# Patient Record
Sex: MALE | Race: ASIAN | NOT HISPANIC OR LATINO | URBAN - METROPOLITAN AREA
[De-identification: names, ages, dates, MRNs, and addresses within clinical notes are randomized per-mention and may not be internally consistent; named-entity substitution may affect disease eponyms.]

---

## 2018-02-02 ENCOUNTER — EMERGENCY (EMERGENCY)
Facility: HOSPITAL | Age: 63
LOS: 1 days | Discharge: HOME | End: 2018-02-02
Attending: EMERGENCY MEDICINE
Payer: COMMERCIAL

## 2018-02-02 VITALS
RESPIRATION RATE: 16 BRPM | SYSTOLIC BLOOD PRESSURE: 129 MMHG | DIASTOLIC BLOOD PRESSURE: 70 MMHG | OXYGEN SATURATION: 97 % | HEART RATE: 77 BPM

## 2018-02-02 VITALS
SYSTOLIC BLOOD PRESSURE: 118 MMHG | OXYGEN SATURATION: 99 % | TEMPERATURE: 98 F | RESPIRATION RATE: 18 BRPM | HEART RATE: 74 BPM | DIASTOLIC BLOOD PRESSURE: 70 MMHG

## 2018-02-02 DIAGNOSIS — Z98.89 OTHER SPECIFIED POSTPROCEDURAL STATES: Chronic | ICD-10-CM

## 2018-02-02 LAB
ALBUMIN SERPL ELPH-MCNC: 3.8 G/DL — SIGNIFICANT CHANGE UP (ref 3.3–5)
ALP SERPL-CCNC: 86 U/L — SIGNIFICANT CHANGE UP (ref 40–120)
ALT FLD-CCNC: 23 U/L RC — SIGNIFICANT CHANGE UP (ref 10–45)
ANION GAP SERPL CALC-SCNC: 13 MMOL/L — SIGNIFICANT CHANGE UP (ref 5–17)
AST SERPL-CCNC: 22 U/L — SIGNIFICANT CHANGE UP (ref 10–40)
BASOPHILS # BLD AUTO: 0 K/UL — SIGNIFICANT CHANGE UP (ref 0–0.2)
BASOPHILS NFR BLD AUTO: 0.5 % — SIGNIFICANT CHANGE UP (ref 0–2)
BILIRUB SERPL-MCNC: 0.3 MG/DL — SIGNIFICANT CHANGE UP (ref 0.2–1.2)
BUN SERPL-MCNC: 19 MG/DL — SIGNIFICANT CHANGE UP (ref 7–23)
CALCIUM SERPL-MCNC: 9 MG/DL — SIGNIFICANT CHANGE UP (ref 8.4–10.5)
CHLORIDE SERPL-SCNC: 101 MMOL/L — SIGNIFICANT CHANGE UP (ref 96–108)
CO2 SERPL-SCNC: 27 MMOL/L — SIGNIFICANT CHANGE UP (ref 22–31)
CREAT SERPL-MCNC: 1.67 MG/DL — HIGH (ref 0.5–1.3)
EOSINOPHIL # BLD AUTO: 0.3 K/UL — SIGNIFICANT CHANGE UP (ref 0–0.5)
EOSINOPHIL NFR BLD AUTO: 3.1 % — SIGNIFICANT CHANGE UP (ref 0–6)
GLUCOSE SERPL-MCNC: 132 MG/DL — HIGH (ref 70–99)
HCT VFR BLD CALC: 37.6 % — LOW (ref 39–50)
HGB BLD-MCNC: 13.7 G/DL — SIGNIFICANT CHANGE UP (ref 13–17)
LYMPHOCYTES # BLD AUTO: 1.1 K/UL — SIGNIFICANT CHANGE UP (ref 1–3.3)
LYMPHOCYTES # BLD AUTO: 11.8 % — LOW (ref 13–44)
MAGNESIUM SERPL-MCNC: 2 MG/DL — SIGNIFICANT CHANGE UP (ref 1.6–2.6)
MCHC RBC-ENTMCNC: 32.9 PG — SIGNIFICANT CHANGE UP (ref 27–34)
MCHC RBC-ENTMCNC: 36.4 GM/DL — HIGH (ref 32–36)
MCV RBC AUTO: 90.5 FL — SIGNIFICANT CHANGE UP (ref 80–100)
MONOCYTES # BLD AUTO: 0.7 K/UL — SIGNIFICANT CHANGE UP (ref 0–0.9)
MONOCYTES NFR BLD AUTO: 7.7 % — SIGNIFICANT CHANGE UP (ref 2–14)
NEUTROPHILS # BLD AUTO: 7.1 K/UL — SIGNIFICANT CHANGE UP (ref 1.8–7.4)
NEUTROPHILS NFR BLD AUTO: 76.9 % — SIGNIFICANT CHANGE UP (ref 43–77)
PLATELET # BLD AUTO: 211 K/UL — SIGNIFICANT CHANGE UP (ref 150–400)
POTASSIUM SERPL-MCNC: 2.8 MMOL/L — CRITICAL LOW (ref 3.5–5.3)
POTASSIUM SERPL-SCNC: 2.8 MMOL/L — CRITICAL LOW (ref 3.5–5.3)
PROT SERPL-MCNC: 6.6 G/DL — SIGNIFICANT CHANGE UP (ref 6–8.3)
RBC # BLD: 4.16 M/UL — LOW (ref 4.2–5.8)
RBC # FLD: 13.1 % — SIGNIFICANT CHANGE UP (ref 10.3–14.5)
SODIUM SERPL-SCNC: 141 MMOL/L — SIGNIFICANT CHANGE UP (ref 135–145)
WBC # BLD: 9.2 K/UL — SIGNIFICANT CHANGE UP (ref 3.8–10.5)
WBC # FLD AUTO: 9.2 K/UL — SIGNIFICANT CHANGE UP (ref 3.8–10.5)

## 2018-02-02 PROCEDURE — 85027 COMPLETE CBC AUTOMATED: CPT

## 2018-02-02 PROCEDURE — 70450 CT HEAD/BRAIN W/O DYE: CPT

## 2018-02-02 PROCEDURE — 80053 COMPREHEN METABOLIC PANEL: CPT

## 2018-02-02 PROCEDURE — 71045 X-RAY EXAM CHEST 1 VIEW: CPT

## 2018-02-02 PROCEDURE — 83735 ASSAY OF MAGNESIUM: CPT

## 2018-02-02 PROCEDURE — 71045 X-RAY EXAM CHEST 1 VIEW: CPT | Mod: 26

## 2018-02-02 PROCEDURE — 93005 ELECTROCARDIOGRAM TRACING: CPT

## 2018-02-02 PROCEDURE — 93010 ELECTROCARDIOGRAM REPORT: CPT

## 2018-02-02 PROCEDURE — 72125 CT NECK SPINE W/O DYE: CPT | Mod: 26

## 2018-02-02 PROCEDURE — 99284 EMERGENCY DEPT VISIT MOD MDM: CPT | Mod: 25

## 2018-02-02 PROCEDURE — 99285 EMERGENCY DEPT VISIT HI MDM: CPT | Mod: 25

## 2018-02-02 PROCEDURE — 72125 CT NECK SPINE W/O DYE: CPT

## 2018-02-02 PROCEDURE — 70450 CT HEAD/BRAIN W/O DYE: CPT | Mod: 26

## 2018-02-02 RX ORDER — POTASSIUM CHLORIDE 20 MEQ
40 PACKET (EA) ORAL ONCE
Qty: 0 | Refills: 0 | Status: COMPLETED | OUTPATIENT
Start: 2018-02-02 | End: 2018-02-02

## 2018-02-02 RX ORDER — SODIUM CHLORIDE 9 MG/ML
1000 INJECTION INTRAMUSCULAR; INTRAVENOUS; SUBCUTANEOUS ONCE
Qty: 0 | Refills: 0 | Status: COMPLETED | OUTPATIENT
Start: 2018-02-02 | End: 2018-02-02

## 2018-02-02 RX ORDER — SODIUM CHLORIDE 9 MG/ML
1000 INJECTION INTRAMUSCULAR; INTRAVENOUS; SUBCUTANEOUS
Qty: 0 | Refills: 0 | Status: DISCONTINUED | OUTPATIENT
Start: 2018-02-02 | End: 2018-02-06

## 2018-02-02 RX ORDER — OXYCODONE AND ACETAMINOPHEN 5; 325 MG/1; MG/1
1 TABLET ORAL ONCE
Qty: 0 | Refills: 0 | Status: DISCONTINUED | OUTPATIENT
Start: 2018-02-02 | End: 2018-02-02

## 2018-02-02 RX ADMIN — SODIUM CHLORIDE 2000 MILLILITER(S): 9 INJECTION INTRAMUSCULAR; INTRAVENOUS; SUBCUTANEOUS at 18:02

## 2018-02-02 RX ADMIN — Medication 40 MILLIEQUIVALENT(S): at 19:06

## 2018-02-02 RX ADMIN — OXYCODONE AND ACETAMINOPHEN 1 TABLET(S): 5; 325 TABLET ORAL at 20:52

## 2018-02-02 RX ADMIN — SODIUM CHLORIDE 150 MILLILITER(S): 9 INJECTION INTRAMUSCULAR; INTRAVENOUS; SUBCUTANEOUS at 20:14

## 2018-02-02 NOTE — ED ADULT NURSE NOTE - OBJECTIVE STATEMENT
63 y/o male, a&o x3, brought in by [facility], s/p syncopal episode outside of dermatologist's office. Patient states he was ringing doorbell and collapsed. Bystander report to EMS approx 5min time unresponsive. EMS reports BP 84/50, placed 20 gauge IV in left AC and administered 1L normal saline, patient returned to systolic BP approx 110. C-Collar applied by EMS. Fingerstick in field of 130. Breathing even, full, unlabored, lungs clear/equal bilateral, no SOB. Abdomen soft, nontender, nondistended. Abrasion of left nose and left eyebrow. Neck soreness on right side. Denies weakness, dizziness, nausea, vomiting, chest pain, or SOB. Resting on stretcher. MD at bedside. Advised of plan of care. Patient placed on continuous cardiac monitoring.

## 2018-02-02 NOTE — ED PROVIDER NOTE - PLAN OF CARE
Hypokalemia 1) You were here for syncope.   2) Take your current medications as prescribed.   3) Follow up with your primary doctor for further evaluation and to answer any questions you have.    4) Return to the emergency department if you experience worsening symptoms, pain, fever, chills, nausea, vomiting or other concerning symptoms.

## 2018-02-02 NOTE — ED PROVIDER NOTE - PMH
Diabetes mellitus    High cholesterol    Hypertension    Renal insufficiency    Tremor    Trigger finger, left

## 2018-02-02 NOTE — ED PROVIDER NOTE - PROGRESS NOTE DETAILS
Spoke with pt's primary medical doctor Antonina Umanzor who is now aware of pt's recent polyuria, dehydration and syncope.  Labs within normal limits, hct and ct c-spine normal.  EKG normal.  Will send home and patient will fu with Dr. Umanzor this week.

## 2018-02-02 NOTE — ED PROVIDER NOTE - ATTENDING CONTRIBUTION TO CARE
------------ATTENDING NOTE------------   63 yo M c/o feeling lightheaded and tunnel vision while standing in office, bystanders describes sudden loss of consciousness, pt fell forward and hit L forehead, awoke after several seconds, no only c/o mild dull frontal headache and diffuse posterior neck pain, no nausea or AMS or confusion, no chest pain/discomfort, no SOB/dyspnea, equal distal pulses, no diaphoresis, no abdominal pain, no h/o syncope, low suspicion for ACS, awaiting labs/imaging and close reassessments -->  - Noé Biswas MD   ------------------------------------------------------- ------------ATTENDING NOTE------------   61 yo M c/o feeling lightheaded and tunnel vision while standing in office, bystanders describes sudden loss of consciousness, pt fell forward and hit L forehead, awoke after several seconds, no only c/o mild dull frontal headache and diffuse posterior neck pain, no nausea or AMS or confusion, no chest pain/discomfort, no SOB/dyspnea, equal distal pulses, no diaphoresis, no abdominal pain, no h/o syncope, low suspicion for ACS, awaiting labs/imaging and close reassessments --> hypokalemia, likely as result to poorly controlled DM/hyperglycemia, syncope related to hypovolemia, remained asymptomatic ED course, d/w son/wife, d/w PCP, in depth d/w all about ddx, tx, mancini, close outpt fu.  - Noé Biswas MD   -------------------------------------------------------

## 2018-02-02 NOTE — ED PROVIDER NOTE - CARE PLAN
Principal Discharge DX:	Syncope and collapse  Secondary Diagnosis:	Closed head injury, initial encounter Principal Discharge DX:	Syncope and collapse  Goal:	Hypokalemia  Secondary Diagnosis:	Closed head injury, initial encounter  Secondary Diagnosis:	Dehydration Principal Discharge DX:	Syncope and collapse  Goal:	Hypokalemia  Assessment and plan of treatment:	1) You were here for syncope.   2) Take your current medications as prescribed.   3) Follow up with your primary doctor for further evaluation and to answer any questions you have.    4) Return to the emergency department if you experience worsening symptoms, pain, fever, chills, nausea, vomiting or other concerning symptoms.  Secondary Diagnosis:	Closed head injury, initial encounter  Secondary Diagnosis:	Dehydration

## 2018-02-02 NOTE — ED PROVIDER NOTE - FAMILY HISTORY
Father  Still living? Unknown  Family history of diabetes mellitus, Age at diagnosis: Age Unknown  History of hypertension, Age at diagnosis: Age Unknown  Family history of high cholesterol, Age at diagnosis: Age Unknown     Mother  Still living? Unknown  Family history of diabetes mellitus, Age at diagnosis: Age Unknown  History of hypertension, Age at diagnosis: Age Unknown  Family history of high cholesterol, Age at diagnosis: Age Unknown     Sibling  Still living? Unknown  Family history of diabetes mellitus, Age at diagnosis: Age Unknown

## 2018-02-02 NOTE — ED PROVIDER NOTE - MUSCULOSKELETAL, MLM
Spine appears normal, range of motion is not limited, no muscle or joint tenderness.  TTP R lateral cervical spine.

## 2018-03-13 ENCOUNTER — APPOINTMENT (OUTPATIENT)
Dept: DERMATOLOGY | Facility: CLINIC | Age: 63
End: 2018-03-13
Payer: COMMERCIAL

## 2018-03-13 VITALS
SYSTOLIC BLOOD PRESSURE: 132 MMHG | HEIGHT: 68 IN | BODY MASS INDEX: 27.28 KG/M2 | DIASTOLIC BLOOD PRESSURE: 80 MMHG | WEIGHT: 180 LBS

## 2018-03-13 DIAGNOSIS — L25.9 UNSPECIFIED CONTACT DERMATITIS, UNSPECIFIED CAUSE: ICD-10-CM

## 2018-03-13 DIAGNOSIS — Z56.0 UNEMPLOYMENT, UNSPECIFIED: ICD-10-CM

## 2018-03-13 DIAGNOSIS — L81.0 POSTINFLAMMATORY HYPERPIGMENTATION: ICD-10-CM

## 2018-03-13 DIAGNOSIS — Z91.89 OTHER SPECIFIED PERSONAL RISK FACTORS, NOT ELSEWHERE CLASSIFIED: ICD-10-CM

## 2018-03-13 PROCEDURE — 99203 OFFICE O/P NEW LOW 30 MIN: CPT

## 2018-03-13 SDOH — ECONOMIC STABILITY - INCOME SECURITY: UNEMPLOYMENT, UNSPECIFIED: Z56.0

## 2018-05-14 ENCOUNTER — RESULT REVIEW (OUTPATIENT)
Age: 63
End: 2018-05-14

## 2018-07-03 ENCOUNTER — APPOINTMENT (OUTPATIENT)
Dept: PULMONOLOGY | Facility: CLINIC | Age: 63
End: 2018-07-03
Payer: COMMERCIAL

## 2018-07-03 VITALS
DIASTOLIC BLOOD PRESSURE: 90 MMHG | TEMPERATURE: 98.4 F | BODY MASS INDEX: 28.79 KG/M2 | RESPIRATION RATE: 46 BRPM | HEIGHT: 68 IN | WEIGHT: 190 LBS | SYSTOLIC BLOOD PRESSURE: 150 MMHG | HEART RATE: 79 BPM

## 2018-07-03 DIAGNOSIS — G47.00 INSOMNIA, UNSPECIFIED: ICD-10-CM

## 2018-07-03 DIAGNOSIS — Z72.821 INADEQUATE SLEEP HYGIENE: ICD-10-CM

## 2018-07-03 DIAGNOSIS — R06.83 SNORING: ICD-10-CM

## 2018-07-03 PROCEDURE — 99204 OFFICE O/P NEW MOD 45 MIN: CPT

## 2018-07-05 RX ORDER — AMOXICILLIN AND CLAVULANATE POTASSIUM 875; 125 MG/1; MG/1
875-125 TABLET, COATED ORAL
Qty: 20 | Refills: 0 | Status: DISCONTINUED | COMMUNITY
Start: 2018-04-02

## 2018-07-05 RX ORDER — PEN NEEDLE, DIABETIC 29 G X1/2"
31G X 5 MM NEEDLE, DISPOSABLE MISCELLANEOUS
Qty: 360 | Refills: 0 | Status: ACTIVE | COMMUNITY
Start: 2017-12-14

## 2018-07-05 RX ORDER — SODIUM SULFATE, POTASSIUM SULFATE, MAGNESIUM SULFATE 17.5; 3.13; 1.6 G/ML; G/ML; G/ML
17.5-3.13-1.6 SOLUTION, CONCENTRATE ORAL
Qty: 354 | Refills: 0 | Status: DISCONTINUED | COMMUNITY
Start: 2018-03-14

## 2018-07-05 RX ORDER — LOSARTAN POTASSIUM 50 MG/1
50 TABLET, FILM COATED ORAL
Qty: 30 | Refills: 0 | Status: DISCONTINUED | COMMUNITY
Start: 2018-02-21

## 2018-07-05 RX ORDER — ACETAMINOPHEN AND CODEINE 300; 30 MG/1; MG/1
300-30 TABLET ORAL
Qty: 8 | Refills: 0 | Status: DISCONTINUED | COMMUNITY
Start: 2018-05-16

## 2018-07-05 RX ORDER — ISOPROPYL ALCOHOL 0.75 G/1
SWAB TOPICAL
Qty: 700 | Refills: 0 | Status: ACTIVE | COMMUNITY
Start: 2017-10-25

## 2018-07-05 RX ORDER — LOSARTAN POTASSIUM 100 MG/1
100 TABLET, FILM COATED ORAL
Qty: 90 | Refills: 0 | Status: ACTIVE | COMMUNITY
Start: 2018-03-02

## 2018-07-05 RX ORDER — TAMSULOSIN HYDROCHLORIDE 0.4 MG/1
0.4 CAPSULE ORAL
Qty: 90 | Refills: 0 | Status: ACTIVE | COMMUNITY
Start: 2018-03-13

## 2018-07-05 RX ORDER — LOSARTAN POTASSIUM 25 MG/1
25 TABLET, FILM COATED ORAL
Qty: 30 | Refills: 0 | Status: DISCONTINUED | COMMUNITY
Start: 2018-02-16

## 2018-07-12 ENCOUNTER — APPOINTMENT (OUTPATIENT)
Dept: SLEEP CENTER | Facility: CLINIC | Age: 63
End: 2018-07-12
Payer: COMMERCIAL

## 2018-07-12 PROCEDURE — 95806 SLEEP STUDY UNATT&RESP EFFT: CPT | Mod: 26

## 2018-07-13 ENCOUNTER — OUTPATIENT (OUTPATIENT)
Dept: OUTPATIENT SERVICES | Facility: HOSPITAL | Age: 63
LOS: 1 days | End: 2018-07-13
Payer: COMMERCIAL

## 2018-07-13 DIAGNOSIS — Z98.89 OTHER SPECIFIED POSTPROCEDURAL STATES: Chronic | ICD-10-CM

## 2018-07-13 PROCEDURE — 95806 SLEEP STUDY UNATT&RESP EFFT: CPT

## 2018-07-16 DIAGNOSIS — G47.33 OBSTRUCTIVE SLEEP APNEA (ADULT) (PEDIATRIC): ICD-10-CM

## 2019-06-10 ENCOUNTER — APPOINTMENT (OUTPATIENT)
Dept: NEPHROLOGY | Facility: CLINIC | Age: 64
End: 2019-06-10
Payer: MEDICARE

## 2019-06-10 VITALS
DIASTOLIC BLOOD PRESSURE: 98 MMHG | WEIGHT: 194 LBS | SYSTOLIC BLOOD PRESSURE: 149 MMHG | BODY MASS INDEX: 29.4 KG/M2 | HEIGHT: 68 IN | OXYGEN SATURATION: 98 % | HEART RATE: 81 BPM

## 2019-06-10 PROCEDURE — 99205 OFFICE O/P NEW HI 60 MIN: CPT

## 2019-06-10 RX ORDER — CARVEDILOL 25 MG/1
25 TABLET, FILM COATED ORAL TWICE DAILY
Qty: 180 | Refills: 3 | Status: ACTIVE | COMMUNITY
Start: 2018-04-02

## 2019-06-10 RX ORDER — TOBRAMYCIN AND DEXAMETHASONE 3; 1 MG/ML; MG/ML
0.3-0.1 SUSPENSION/ DROPS OPHTHALMIC
Qty: 5 | Refills: 0 | Status: DISCONTINUED | COMMUNITY
Start: 2018-05-16 | End: 2019-06-10

## 2019-06-10 RX ORDER — HYDRALAZINE HYDROCHLORIDE 25 MG/1
25 TABLET ORAL 3 TIMES DAILY
Qty: 270 | Refills: 3 | Status: ACTIVE | COMMUNITY
Start: 2019-06-10

## 2019-06-10 RX ORDER — AMLODIPINE BESYLATE 10 MG/1
10 TABLET ORAL DAILY
Qty: 30 | Refills: 11 | Status: ACTIVE | COMMUNITY
Start: 2018-02-16

## 2019-06-10 RX ORDER — HYDROCHLOROTHIAZIDE 12.5 MG/1
12.5 TABLET ORAL
Qty: 30 | Refills: 0 | Status: DISCONTINUED | COMMUNITY
Start: 2018-03-02 | End: 2019-06-10

## 2019-06-10 NOTE — PHYSICAL EXAM
[General Appearance - Alert] : alert [General Appearance - In No Acute Distress] : in no acute distress [Sclera] : the sclera and conjunctiva were normal [PERRL With Normal Accommodation] : pupils were equal in size, round, and reactive to light [Extraocular Movements] : extraocular movements were intact [Outer Ear] : the ears and nose were normal in appearance [Oropharynx] : the oropharynx was normal [Neck Appearance] : the appearance of the neck was normal [Neck Cervical Mass (___cm)] : no neck mass was observed [Jugular Venous Distention Increased] : there was no jugular-venous distention [Thyroid Diffuse Enlargement] : the thyroid was not enlarged [Thyroid Nodule] : there were no palpable thyroid nodules [Auscultation Breath Sounds / Voice Sounds] : lungs were clear to auscultation bilaterally [Heart Rate And Rhythm] : heart rate was normal and rhythm regular [Heart Sounds Gallop] : no gallops [Heart Sounds] : normal S1 and S2 [Heart Sounds Pericardial Friction Rub] : no pericardial rub [Murmurs] : no murmurs [Full Pulse] : the pedal pulses are present [Edema] : there was no peripheral edema [Bowel Sounds] : normal bowel sounds [Abdomen Tenderness] : non-tender [Abdomen Soft] : soft [Abdomen Mass (___ Cm)] : no abdominal mass palpated [] : no rash [Involuntary Movements] : no involuntary movements were seen [No Focal Deficits] : no focal deficits [Oriented To Time, Place, And Person] : oriented to person, place, and time [Impaired Insight] : insight and judgment were intact [Affect] : the affect was normal [FreeTextEntry1] : tattoes

## 2019-06-10 NOTE — REASON FOR VISIT
[Consultation] : a consultation visit [FreeTextEntry1] : Referred for nephrology consultation by primary MD, for potassium abnormalities

## 2019-06-10 NOTE — ASSESSMENT
[FreeTextEntry1] : Elevated creatinine: Will repeat blood chemistry including phosphorus, uric acid, A1C, immunofixation and Hepatitis B and C panel.\par Will get repeat urinalysis and urine protein/creatinine. \par Low serum K, now normal on supplementsCheck urine lytes.\par Will get renal and post void bladder sonogram\par CKD risk factors- smoker, DM, HTN\par Discussed current level of renal function/proteinuria and its implications.\par Discussed renal function preservation strategies including: Sleep hygiene, avoiding NSAIDs and herbal medications, avoiding excessive animal protein and sodium in diet, maintaining optimized weight, blood pressure, glucose and lipids.\par Discussed hyperinsulinemia, metabolic syndrome and implication for cardiovascular health.\par F/u in 6 weeks to review labs and imaging.\par

## 2019-06-10 NOTE — HISTORY OF PRESENT ILLNESS
[FreeTextEntry1] : 63 years old male, born in [facility],  living in  since \par Patient has no known kidney disease. He is on follow up with Dr. Antonina Umanzor is here for nephrology consultation\par He is told his potassium level in blood was abnormal (he thinks low) in the past but normal now.\par He has known DM (age 30's) On insulin (age 54); HTN (age 30's). H/o Hyperlipidemia. No h/o gout.\par No known h/o kidney stone/ Prostatism.\par No hematuria/Transfusions\par Nocturia:3-4 times\par Has no h/o Pneumonia / UTI.\par No known h/o active CAD/CVA/PVD/DVT/neoplasia/active infections/bleeding.\par Reports no major allergies except for allergic rhinitis. Does not take any blood thinners. No known h/o tuberculosis or hepatitis.\par Most recent hospitalization/for: 2018 for low glucose level\par Past surgeries:\par hemorrhoids, arthroscopic knee surgery, bilateral carpal tunnel syndrome surgery, right ankle fracture repair\par No history of kidney/ bladder/ prostate surgery.\par He is a smoker. Half ppd x 40 years\par Fam: Parents are . Father - 65 Mother- 70 Siblings- 2 brothers and one sister are alive.\par Children: One son, 23, Healthy. \par No family history of kidney disease\par Independent for ADL\par Lives with wife, Robert. Wife is an assistant director of nursing at [employer], working part time. Son, Ruben  is studying at [employer], studying earth science. \par Able to walk two blocks, can climb stairs without difficulty.\par ROS: Has h/o shortness of breath on exertion. No h/o Sleep apnea. 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 Vazquez- Has had stress test and echo, takes \par Cancer Screen:Colonoscopy, 2018 \par Imaging:\par Consultants:\par Primary MD:Antonina Umanzor\par \par Available lab data: Creatinine: \par (3/20/19) 1.5 ( e GFR 48 -56) (11/10/18) 1.61 (18) 1.35 (18) 1.5 (1/10/18) 1.55\par \par (3/20/18) chol 158\par \par Others (3/20/19) Na 138 K 4.1 CO 2 : 24 (18) phos: 3\par (3/20/19) Hemoglobin a1 C: 8.4\par \par urinalysis (17); urinalysis: protein: none blood: none glucose: none\par \par (18) colonoscopy; hyperplastic polyp\par \par (1/10/18) TSH: 0.96\par

## 2019-06-13 ENCOUNTER — FORM ENCOUNTER (OUTPATIENT)
Age: 64
End: 2019-06-13

## 2019-06-14 ENCOUNTER — OUTPATIENT (OUTPATIENT)
Dept: OUTPATIENT SERVICES | Facility: HOSPITAL | Age: 64
LOS: 1 days | End: 2019-06-14
Payer: MEDICARE

## 2019-06-14 ENCOUNTER — APPOINTMENT (OUTPATIENT)
Dept: ULTRASOUND IMAGING | Facility: CLINIC | Age: 64
End: 2019-06-14
Payer: MEDICARE

## 2019-06-14 DIAGNOSIS — Z98.89 OTHER SPECIFIED POSTPROCEDURAL STATES: Chronic | ICD-10-CM

## 2019-06-14 DIAGNOSIS — Z00.8 ENCOUNTER FOR OTHER GENERAL EXAMINATION: ICD-10-CM

## 2019-06-14 PROCEDURE — 93975 VASCULAR STUDY: CPT

## 2019-06-14 PROCEDURE — 93975 VASCULAR STUDY: CPT | Mod: 26

## 2019-07-22 ENCOUNTER — APPOINTMENT (OUTPATIENT)
Dept: NEPHROLOGY | Facility: CLINIC | Age: 64
End: 2019-07-22
Payer: MEDICARE

## 2019-07-22 VITALS
HEART RATE: 80 BPM | SYSTOLIC BLOOD PRESSURE: 143 MMHG | DIASTOLIC BLOOD PRESSURE: 90 MMHG | HEIGHT: 68 IN | WEIGHT: 194.89 LBS | OXYGEN SATURATION: 97 % | BODY MASS INDEX: 29.54 KG/M2

## 2019-07-22 VITALS — DIASTOLIC BLOOD PRESSURE: 84 MMHG | SYSTOLIC BLOOD PRESSURE: 130 MMHG

## 2019-07-22 LAB
25(OH)D3 SERPL-MCNC: 39.8 NG/ML
ALBUMIN SERPL ELPH-MCNC: 4.6 G/DL
ALP BLD-CCNC: 97 U/L
ALT SERPL-CCNC: 11 U/L
ANION GAP SERPL CALC-SCNC: 13 MMOL/L
APPEARANCE: ABNORMAL
AST SERPL-CCNC: 14 U/L
BACTERIA: NEGATIVE
BASOPHILS # BLD AUTO: 0.08 K/UL
BASOPHILS NFR BLD AUTO: 0.8 %
BILIRUB SERPL-MCNC: 0.4 MG/DL
BILIRUBIN URINE: NEGATIVE
BLOOD URINE: NEGATIVE
BUN SERPL-MCNC: 22 MG/DL
CALCIUM OXALATE CRYSTALS: ABNORMAL
CALCIUM SERPL-MCNC: 9.8 MG/DL
CALCIUM SERPL-MCNC: 9.8 MG/DL
CHLORIDE SERPL-SCNC: 102 MMOL/L
CHOLEST SERPL-MCNC: 185 MG/DL
CHOLEST/HDLC SERPL: 4.1 RATIO
CO2 SERPL-SCNC: 26 MMOL/L
COLOR: YELLOW
CREAT SERPL-MCNC: 1.68 MG/DL
CREAT SPEC-SCNC: 297 MG/DL
CREAT/PROT UR: 0.1 RATIO
EOSINOPHIL # BLD AUTO: 0.54 K/UL
EOSINOPHIL NFR BLD AUTO: 5.4 %
ESTIMATED AVERAGE GLUCOSE: 192 MG/DL
GLUCOSE QUALITATIVE U: NORMAL
GLUCOSE SERPL-MCNC: 78 MG/DL
HBA1C MFR BLD HPLC: 8.3 %
HBV SURFACE AG SER QL: NONREACTIVE
HCT VFR BLD CALC: 44.6 %
HCV AB SER QL: NONREACTIVE
HCV S/CO RATIO: 0.15 S/CO
HDLC SERPL-MCNC: 45 MG/DL
HGB BLD-MCNC: 14.7 G/DL
HYALINE CASTS: 0 /LPF
IMM GRANULOCYTES NFR BLD AUTO: 0.3 %
KETONES URINE: NEGATIVE
LDLC SERPL CALC-MCNC: 95 MG/DL
LEUKOCYTE ESTERASE URINE: NEGATIVE
LYMPHOCYTES # BLD AUTO: 1.98 K/UL
LYMPHOCYTES NFR BLD AUTO: 19.8 %
M PROTEIN SPEC IFE-MCNC: NORMAL
MAGNESIUM SERPL-MCNC: 2.2 MG/DL
MAN DIFF?: NORMAL
MCHC RBC-ENTMCNC: 30.6 PG
MCHC RBC-ENTMCNC: 33 GM/DL
MCV RBC AUTO: 92.9 FL
MICROSCOPIC-UA: NORMAL
MONOCYTES # BLD AUTO: 0.85 K/UL
MONOCYTES NFR BLD AUTO: 8.5 %
NEUTROPHILS # BLD AUTO: 6.5 K/UL
NEUTROPHILS NFR BLD AUTO: 65.2 %
NITRITE URINE: NEGATIVE
PARATHYROID HORMONE INTACT: 32 PG/ML
PH URINE: 6
PHOSPHATE SERPL-MCNC: 4.1 MG/DL
PLATELET # BLD AUTO: 244 K/UL
POTASSIUM SERPL-SCNC: 4.4 MMOL/L
POTASSIUM UR-SCNC: 127 MMOL/L
PROT SERPL-MCNC: 7.4 G/DL
PROT UR-MCNC: 30 MG/DL
PROTEIN URINE: ABNORMAL
RBC # BLD: 4.8 M/UL
RBC # FLD: 12.4 %
RED BLOOD CELLS URINE: 0 /HPF
SODIUM ?TM SUB UR QN: 46 MMOL/L
SODIUM SERPL-SCNC: 141 MMOL/L
SPECIFIC GRAVITY URINE: 1.03
SQUAMOUS EPITHELIAL CELLS: 1 /HPF
TRIGL SERPL-MCNC: 225 MG/DL
URATE SERPL-MCNC: 5.6 MG/DL
UROBILINOGEN URINE: NORMAL
WBC # FLD AUTO: 9.98 K/UL
WHITE BLOOD CELLS URINE: 1 /HPF

## 2019-07-22 PROCEDURE — 99214 OFFICE O/P EST MOD 30 MIN: CPT

## 2019-07-22 NOTE — PHYSICAL EXAM
[General Appearance - Alert] : alert [General Appearance - In No Acute Distress] : in no acute distress [Sclera] : the sclera and conjunctiva were normal [PERRL With Normal Accommodation] : pupils were equal in size, round, and reactive to light [Extraocular Movements] : extraocular movements were intact [Outer Ear] : the ears and nose were normal in appearance [Oropharynx] : the oropharynx was normal [Neck Appearance] : the appearance of the neck was normal [Neck Cervical Mass (___cm)] : no neck mass was observed [Jugular Venous Distention Increased] : there was no jugular-venous distention [Thyroid Nodule] : there were no palpable thyroid nodules [Thyroid Diffuse Enlargement] : the thyroid was not enlarged [Auscultation Breath Sounds / Voice Sounds] : lungs were clear to auscultation bilaterally [Heart Sounds] : normal S1 and S2 [Heart Rate And Rhythm] : heart rate was normal and rhythm regular [Heart Sounds Gallop] : no gallops [Murmurs] : no murmurs [Heart Sounds Pericardial Friction Rub] : no pericardial rub [Edema] : there was no peripheral edema [Bowel Sounds] : normal bowel sounds [Full Pulse] : the pedal pulses are present [Abdomen Soft] : soft [Abdomen Tenderness] : non-tender [Abdomen Mass (___ Cm)] : no abdominal mass palpated [Involuntary Movements] : no involuntary movements were seen [] : no rash [FreeTextEntry1] : tattoes [Oriented To Time, Place, And Person] : oriented to person, place, and time [No Focal Deficits] : no focal deficits [Affect] : the affect was normal [Impaired Insight] : insight and judgment were intact

## 2019-07-22 NOTE — HISTORY OF PRESENT ILLNESS
[FreeTextEntry1] : Mr Collazo is here for follow up. Has no acute symptoms\par Here for CKD f/u\par Reviewed labs and sonogram

## 2019-07-22 NOTE — ASSESSMENT
[FreeTextEntry1] : Elevated creatinine: Likely chronic kidney disease due to uncontrolled DM, tobacco use, HTN, hyperlipidemia.\par Will get repeat urinalysis and urine protein/creatinine. \par CKD risk factors- smoker, DM, HTN\par Discussed current level of renal function/proteinuria and its implications.\par Discussed Renal Preservation strategies including achieving optimal weight through calory restriction and regular exercise, daily exercise, sleep hygiene, optimized control of glucose, blood pressure, lipids, avoidance of NSAIDs, Low Na and Low animal protein diet and medication adherence.\par DM: follows with Dr. Rudolph\par Hypertension, Hyperlipidemia: On f/u primary MD\par Tobacco use: discussed smoking cessation and strongly advised to.\par F/u in 3 months

## 2019-10-17 ENCOUNTER — APPOINTMENT (OUTPATIENT)
Dept: UROLOGY | Facility: CLINIC | Age: 64
End: 2019-10-17
Payer: MEDICARE

## 2019-10-17 VITALS
SYSTOLIC BLOOD PRESSURE: 120 MMHG | DIASTOLIC BLOOD PRESSURE: 82 MMHG | OXYGEN SATURATION: 98 % | RESPIRATION RATE: 13 BRPM | HEIGHT: 68 IN | BODY MASS INDEX: 29.4 KG/M2 | HEART RATE: 77 BPM | WEIGHT: 194 LBS

## 2019-10-17 DIAGNOSIS — Z86.39 PERSONAL HISTORY OF OTHER ENDOCRINE, NUTRITIONAL AND METABOLIC DISEASE: ICD-10-CM

## 2019-10-17 PROCEDURE — 99203 OFFICE O/P NEW LOW 30 MIN: CPT

## 2019-10-17 NOTE — HISTORY OF PRESENT ILLNESS
[FreeTextEntry1] : He is a 64-year-old man who was seen today for initial visit. He usually does not have significant urinary symptoms. There is no hematuria or dysuria. Nocturia is 2-4 times depending on diabetic control. Hemoglobin A1c was around 7. There is no family history of prostate cancer. PSA level was 4.7 in September and 4.8 in June 2019. Multiple prior PSA levels were 4.

## 2019-10-17 NOTE — PHYSICAL EXAM
[General Appearance - Well Developed] : well developed [General Appearance - Well Nourished] : well nourished [Normal Appearance] : normal appearance [Well Groomed] : well groomed [General Appearance - In No Acute Distress] : no acute distress [Edema] : no peripheral edema [Respiration, Rhythm And Depth] : normal respiratory rhythm and effort [Exaggerated Use Of Accessory Muscles For Inspiration] : no accessory muscle use [Abdomen Soft] : soft [Abdomen Tenderness] : non-tender [Costovertebral Angle Tenderness] : no ~M costovertebral angle tenderness [Urethral Meatus] : meatus normal [Scrotum] : the scrotum was normal [Urinary Bladder Findings] : the bladder was normal on palpation [Testes Mass (___cm)] : there were no testicular masses [Prostate Tenderness] : the prostate was not tender [Prostate Enlargement] : the prostate was not enlarged [No Prostate Nodules] : no prostate nodules [Normal Station and Gait] : the gait and station were normal for the patient's age [] : no rash [No Focal Deficits] : no focal deficits [Oriented To Time, Place, And Person] : oriented to person, place, and time [Affect] : the affect was normal [Mood] : the mood was normal [Not Anxious] : not anxious [Inguinal Lymph Nodes Enlarged Bilaterally] : inguinal

## 2019-10-17 NOTE — ASSESSMENT
[FreeTextEntry1] : We had a long discussion regarding his PSA level. We discussed different PSA parameters such as PSA velocity, free PSA and age-adjusted PSA level. Other markers such as 4K score, PCA3, Prostate health Index, etc were reviewed.  We also discussed observation with repeat PSA level, prostate or pelvic MRI and prostate biopsy in detail. Risks and benefits of each option were discussed and questions were answered. Patient was made aware that prostate cancer can exist at any PSA level.\par Prostate biopsy was reviewed in detail. We discussed how the procedure is performed. Preparation with oral antibiotics and Fleet enema was reviewed. Risks of biopsy especially bacteremia, UTI, sepsis, bleeding, erectile dysfunction, etc were discussed. He was made aware that biopsy may not be able to diagnose prostate cancer. Questions were answered. For now, he has decided on observation and repeat PSA level in about 4-6 months.\par \par Joseph Butterfield MD, FACS\par [facility]\par  of Urology\par \par [address], [address]\[place] [address]\par \par [address], [address]\[place] [address]\par \par Tel: [phone]\par Fax: [phone]

## 2019-10-17 NOTE — REVIEW OF SYSTEMS
[Feeling Tired] : feeling tired [Poor quality erections] : Poor quality erections [Loss of interest] : loss of interest in sexual activity [Constipation] : constipation [Joint Pain] : joint pain [Wake up at night to urinate  How many times?  ___] : wakes up to urinate [unfilled] times during the night [Strong urge to urinate] : strong urge to urinate [Itching] : itching [Feelings Of Weakness] : feelings of weakness [Negative] : Heme/Lymph

## 2019-10-17 NOTE — LETTER BODY
[Dear  ___] : Dear  [unfilled], [Consult Letter:] : I had the pleasure of evaluating your patient, [unfilled]. [Consult Closing:] : Thank you very much for allowing me to participate in the care of this patient.  If you have any questions, please do not hesitate to contact me. [FreeTextEntry1] : [facility]\par [address] \[place], [place], [address]  \par [phone] \par

## 2019-10-21 ENCOUNTER — APPOINTMENT (OUTPATIENT)
Dept: NEPHROLOGY | Facility: CLINIC | Age: 64
End: 2019-10-21

## 2020-04-17 ENCOUNTER — APPOINTMENT (OUTPATIENT)
Dept: UROLOGY | Facility: CLINIC | Age: 65
End: 2020-04-17

## 2020-06-10 ENCOUNTER — APPOINTMENT (OUTPATIENT)
Dept: UROLOGY | Facility: CLINIC | Age: 65
End: 2020-06-10

## 2020-08-11 ENCOUNTER — RX RENEWAL (OUTPATIENT)
Age: 65
End: 2020-08-11

## 2020-08-11 RX ORDER — SPIRONOLACTONE 25 MG/1
25 TABLET ORAL DAILY
Qty: 90 | Refills: 3 | Status: ACTIVE | COMMUNITY
Start: 2019-06-10 | End: 1900-01-01

## 2020-09-21 ENCOUNTER — TRANSCRIPTION ENCOUNTER (OUTPATIENT)
Age: 65
End: 2020-09-21

## 2020-09-21 ENCOUNTER — APPOINTMENT (OUTPATIENT)
Dept: NEPHROLOGY | Facility: CLINIC | Age: 65
End: 2020-09-21
Payer: MEDICARE

## 2020-09-21 VITALS — DIASTOLIC BLOOD PRESSURE: 84 MMHG | SYSTOLIC BLOOD PRESSURE: 120 MMHG

## 2020-09-21 VITALS
SYSTOLIC BLOOD PRESSURE: 146 MMHG | OXYGEN SATURATION: 98 % | WEIGHT: 200.62 LBS | DIASTOLIC BLOOD PRESSURE: 87 MMHG | HEART RATE: 98 BPM | BODY MASS INDEX: 30.5 KG/M2

## 2020-09-21 DIAGNOSIS — Z72.0 TOBACCO USE: ICD-10-CM

## 2020-09-21 PROCEDURE — 99214 OFFICE O/P EST MOD 30 MIN: CPT

## 2020-09-21 NOTE — REASON FOR VISIT
[Follow-Up] : a follow-up visit [FreeTextEntry1] : follow up, Chronic kidney disease, yearly follow up

## 2020-09-21 NOTE — PHYSICAL EXAM
Mia Pendleton is a 49year old male who presents for a complete physical exam.   HPI:    Ashley May is a 48-year-old white male who was seen by me on June 21, 2019 for his annual physical examination.   At the time the examination Mr. Marion May was GENERAL: feels well   EYES:denies blurred vision or double vision  HEENT: denies nasal congestion, sinus pain or ST  LUNGS: denies shortness of breath or cough  CARDIOVASCULAR: denies chest pain or pressure or palpitations  GI: denies abdominal pain, N/V taken in 6 months which will include a lipid panel, AST, ALT and TSH. I will plan to see the patient back in 1 year. Patient is to call me 1 to 2 months prior to the examination for me to place the orders into the system.   I will see the patient back bridger [General Appearance - Alert] : alert [General Appearance - In No Acute Distress] : in no acute distress [Sclera] : the sclera and conjunctiva were normal [PERRL With Normal Accommodation] : pupils were equal in size, round, and reactive to light [Extraocular Movements] : extraocular movements were intact [Outer Ear] : the ears and nose were normal in appearance [Oropharynx] : the oropharynx was normal [Neck Appearance] : the appearance of the neck was normal [Neck Cervical Mass (___cm)] : no neck mass was observed [Jugular Venous Distention Increased] : there was no jugular-venous distention [Thyroid Diffuse Enlargement] : the thyroid was not enlarged [Thyroid Nodule] : there were no palpable thyroid nodules [Auscultation Breath Sounds / Voice Sounds] : lungs were clear to auscultation bilaterally [Heart Rate And Rhythm] : heart rate was normal and rhythm regular [Heart Sounds] : normal S1 and S2 [Heart Sounds Gallop] : no gallops [Murmurs] : no murmurs [Heart Sounds Pericardial Friction Rub] : no pericardial rub [Full Pulse] : the pedal pulses are present [Edema] : there was no peripheral edema [Bowel Sounds] : normal bowel sounds [Abdomen Soft] : soft [Abdomen Tenderness] : non-tender [Abdomen Mass (___ Cm)] : no abdominal mass palpated [Involuntary Movements] : no involuntary movements were seen [] : no rash [FreeTextEntry1] : tattoes [No Focal Deficits] : no focal deficits [Oriented To Time, Place, And Person] : oriented to person, place, and time [Impaired Insight] : insight and judgment were intact [Affect] : the affect was normal

## 2020-09-21 NOTE — HISTORY OF PRESENT ILLNESS
[FreeTextEntry1] : Mr Collazo is here for follow up. Has no acute symptoms\par Here for CKD f/u\par Reviewed labs and sonogram from last year.\par Smokes 3 cigarettes/day\par Not physically active\par no acute symptoms\par [facility] [place] CGM and insulin pump.

## 2020-09-21 NOTE — ASSESSMENT
[FreeTextEntry1] : Elevated creatinine: Likely chronic kidney disease due to uncontrolled DM, tobacco use, HTN, hyperlipidemia.\par Will get repeat urinalysis and urine protein/creatinine. \par CKD risk factors- smoker, DM, HTN\par Discussed current level of renal function  and its implications.\par Discussed Renal Preservation strategies including achieving optimal weight through calory restriction and regular exercise, daily exercise, sleep hygiene, optimized control of glucose, blood pressure, lipids, avoidance of NSAIDs, Low Na and Low animal protein diet and medication adherence.\par DM: follows with Dr. Rudolph\par Hypertension, Hyperlipidemia: On f/u primary MD\par Tobacco use: discussed again smoking cessation and strongly advised to.\par F/u in 3 months

## 2020-10-26 LAB
25(OH)D3 SERPL-MCNC: 41.4 NG/ML
ALBUMIN SERPL ELPH-MCNC: 4.5 G/DL
ALP BLD-CCNC: 96 U/L
ALT SERPL-CCNC: 15 U/L
ANION GAP SERPL CALC-SCNC: 14 MMOL/L
APPEARANCE: CLEAR
AST SERPL-CCNC: 17 U/L
BACTERIA: NEGATIVE
BASOPHILS # BLD AUTO: 0.07 K/UL
BASOPHILS NFR BLD AUTO: 0.8 %
BILIRUB SERPL-MCNC: 0.2 MG/DL
BILIRUBIN URINE: NEGATIVE
BLOOD URINE: NEGATIVE
BUN SERPL-MCNC: 25 MG/DL
CALCIUM SERPL-MCNC: 9.2 MG/DL
CALCIUM SERPL-MCNC: 9.2 MG/DL
CHLORIDE SERPL-SCNC: 104 MMOL/L
CHOLEST SERPL-MCNC: 149 MG/DL
CHOLEST/HDLC SERPL: 3.7 RATIO
CO2 SERPL-SCNC: 22 MMOL/L
COLOR: YELLOW
CREAT SERPL-MCNC: 1.79 MG/DL
CREAT SPEC-SCNC: 283 MG/DL
CREAT/PROT UR: 0.1 RATIO
EOSINOPHIL # BLD AUTO: 0.55 K/UL
EOSINOPHIL NFR BLD AUTO: 6 %
ESTIMATED AVERAGE GLUCOSE: 143 MG/DL
GLUCOSE QUALITATIVE U: NEGATIVE
GLUCOSE SERPL-MCNC: 100 MG/DL
HBA1C MFR BLD HPLC: 6.6 %
HCT VFR BLD CALC: 43 %
HDLC SERPL-MCNC: 40 MG/DL
HGB BLD-MCNC: 14.1 G/DL
HYALINE CASTS: 1 /LPF
IMM GRANULOCYTES NFR BLD AUTO: 0.3 %
KETONES URINE: NEGATIVE
LDLC SERPL CALC-MCNC: 61 MG/DL
LEUKOCYTE ESTERASE URINE: NEGATIVE
LYMPHOCYTES # BLD AUTO: 1.8 K/UL
LYMPHOCYTES NFR BLD AUTO: 19.8 %
MAGNESIUM SERPL-MCNC: 2.2 MG/DL
MAN DIFF?: NORMAL
MCHC RBC-ENTMCNC: 31.3 PG
MCHC RBC-ENTMCNC: 32.8 GM/DL
MCV RBC AUTO: 95.3 FL
MICROSCOPIC-UA: NORMAL
MONOCYTES # BLD AUTO: 0.76 K/UL
MONOCYTES NFR BLD AUTO: 8.3 %
NEUTROPHILS # BLD AUTO: 5.9 K/UL
NEUTROPHILS NFR BLD AUTO: 64.8 %
NITRITE URINE: NEGATIVE
PARATHYROID HORMONE INTACT: 28 PG/ML
PH URINE: 6
PHOSPHATE SERPL-MCNC: 2.6 MG/DL
PLATELET # BLD AUTO: 226 K/UL
POTASSIUM SERPL-SCNC: 5.3 MMOL/L
PROT SERPL-MCNC: 7.3 G/DL
PROT UR-MCNC: 17 MG/DL
PROTEIN URINE: NORMAL
RBC # BLD: 4.51 M/UL
RBC # FLD: 12.6 %
RED BLOOD CELLS URINE: 1 /HPF
SODIUM SERPL-SCNC: 140 MMOL/L
SPECIFIC GRAVITY URINE: 1.03
SQUAMOUS EPITHELIAL CELLS: 0 /HPF
TRIGL SERPL-MCNC: 240 MG/DL
URATE SERPL-MCNC: 5.7 MG/DL
UROBILINOGEN URINE: NORMAL
WBC # FLD AUTO: 9.11 K/UL
WHITE BLOOD CELLS URINE: 1 /HPF

## 2020-12-21 ENCOUNTER — APPOINTMENT (OUTPATIENT)
Dept: NEPHROLOGY | Facility: CLINIC | Age: 65
End: 2020-12-21

## 2020-12-21 ENCOUNTER — APPOINTMENT (OUTPATIENT)
Dept: NEPHROLOGY | Facility: CLINIC | Age: 65
End: 2020-12-21
Payer: MEDICARE

## 2020-12-21 DIAGNOSIS — E11.65 TYPE 2 DIABETES MELLITUS WITH HYPERGLYCEMIA: ICD-10-CM

## 2020-12-21 PROCEDURE — 99214 OFFICE O/P EST MOD 30 MIN: CPT | Mod: 95

## 2020-12-21 RX ORDER — POTASSIUM CHLORIDE 1500 MG/1
20 TABLET, EXTENDED RELEASE ORAL
Qty: 90 | Refills: 3 | Status: DISCONTINUED | COMMUNITY
Start: 2018-05-17 | End: 2020-12-21

## 2020-12-21 NOTE — ASSESSMENT
[FreeTextEntry1] : Elevated creatinine, chronic kidney disease due to uncontrolled DM, tobacco use, HTN, hyperlipidemia.\par CKD risk factors- smoker, DM, HTN\par Discussed current level of renal function  and its implications.\par Discussed again Renal Preservation strategies including achieving optimal weight through calory restriction and regular exercise, daily exercise, sleep hygiene, optimized control of glucose, blood pressure, lipids, avoidance of NSAIDs, Low Na and Low animal protein diet and medication adherence.\par DM: follows with Dr. Rudolph for endocrinology follow up.\par Hypertension, Hyperlipidemia: On f/u primary MD\par Requested labs to be done next week without any K supplements.\par F/u in 3 months

## 2020-12-21 NOTE — REASON FOR VISIT
[Follow-Up] : a follow-up visit [FreeTextEntry1] : Follow up, Chronic kidney disease, on telehealth follow up

## 2020-12-21 NOTE — HISTORY OF PRESENT ILLNESS
[Home] : at home, [unfilled] , at the time of the visit. [Medical Office: ([facility])___] : at the medical office located in  [Verbal consent obtained from patient] : the patient, [unfilled] [FreeTextEntry1] : Mr Collazo is here for follow up. Has no acute symptoms\par Here for CKD f/u\par no acute symptoms\par [facility] CGM and insulin pump.\par

## 2021-01-14 LAB
25(OH)D3 SERPL-MCNC: 43 NG/ML
ALBUMIN SERPL ELPH-MCNC: 4.4 G/DL
ALP BLD-CCNC: 88 U/L
ALT SERPL-CCNC: 12 U/L
ANION GAP SERPL CALC-SCNC: 9 MMOL/L
APPEARANCE: CLEAR
AST SERPL-CCNC: 14 U/L
BACTERIA: NEGATIVE
BASOPHILS # BLD AUTO: 0.05 K/UL
BASOPHILS NFR BLD AUTO: 0.7 %
BILIRUB SERPL-MCNC: 0.3 MG/DL
BILIRUBIN URINE: NEGATIVE
BLOOD URINE: NEGATIVE
BUN SERPL-MCNC: 25 MG/DL
CALCIUM SERPL-MCNC: 9.8 MG/DL
CALCIUM SERPL-MCNC: 9.8 MG/DL
CHLORIDE SERPL-SCNC: 104 MMOL/L
CHOLEST SERPL-MCNC: 156 MG/DL
CO2 SERPL-SCNC: 25 MMOL/L
COLOR: YELLOW
CREAT SERPL-MCNC: 1.72 MG/DL
CREAT SPEC-SCNC: 154 MG/DL
CREAT/PROT UR: 0.1 RATIO
EOSINOPHIL # BLD AUTO: 0.53 K/UL
EOSINOPHIL NFR BLD AUTO: 7 %
GLUCOSE QUALITATIVE U: ABNORMAL
GLUCOSE SERPL-MCNC: 244 MG/DL
HCT VFR BLD CALC: 41.3 %
HDLC SERPL-MCNC: 50 MG/DL
HGB BLD-MCNC: 13.6 G/DL
HYALINE CASTS: 0 /LPF
IMM GRANULOCYTES NFR BLD AUTO: 0.3 %
KETONES URINE: NEGATIVE
LDLC SERPL CALC-MCNC: 75 MG/DL
LEUKOCYTE ESTERASE URINE: NEGATIVE
LYMPHOCYTES # BLD AUTO: 1.37 K/UL
LYMPHOCYTES NFR BLD AUTO: 18 %
MAGNESIUM SERPL-MCNC: 2.2 MG/DL
MAN DIFF?: NORMAL
MCHC RBC-ENTMCNC: 31.1 PG
MCHC RBC-ENTMCNC: 32.9 GM/DL
MCV RBC AUTO: 94.5 FL
MICROSCOPIC-UA: NORMAL
MONOCYTES # BLD AUTO: 0.57 K/UL
MONOCYTES NFR BLD AUTO: 7.5 %
NEUTROPHILS # BLD AUTO: 5.08 K/UL
NEUTROPHILS NFR BLD AUTO: 66.5 %
NITRITE URINE: NEGATIVE
NONHDLC SERPL-MCNC: 106 MG/DL
PARATHYROID HORMONE INTACT: 35 PG/ML
PH URINE: 5.5
PHOSPHATE SERPL-MCNC: 3.1 MG/DL
PLATELET # BLD AUTO: 205 K/UL
POTASSIUM SERPL-SCNC: 5 MMOL/L
PROT SERPL-MCNC: 7 G/DL
PROT UR-MCNC: 10 MG/DL
PROTEIN URINE: NEGATIVE
RBC # BLD: 4.37 M/UL
RBC # FLD: 12.7 %
RED BLOOD CELLS URINE: 0 /HPF
SODIUM SERPL-SCNC: 138 MMOL/L
SPECIFIC GRAVITY URINE: 1.03
SQUAMOUS EPITHELIAL CELLS: 0 /HPF
TRIGL SERPL-MCNC: 157 MG/DL
URATE SERPL-MCNC: 5.1 MG/DL
UROBILINOGEN URINE: NORMAL
WBC # FLD AUTO: 7.62 K/UL
WHITE BLOOD CELLS URINE: 0 /HPF

## 2021-07-19 ENCOUNTER — APPOINTMENT (OUTPATIENT)
Dept: UROLOGY | Facility: CLINIC | Age: 66
End: 2021-07-19
Payer: MEDICARE

## 2021-07-19 VITALS
BODY MASS INDEX: 30.31 KG/M2 | SYSTOLIC BLOOD PRESSURE: 138 MMHG | WEIGHT: 200 LBS | TEMPERATURE: 98.2 F | DIASTOLIC BLOOD PRESSURE: 86 MMHG | OXYGEN SATURATION: 96 % | RESPIRATION RATE: 14 BRPM | HEIGHT: 68 IN | HEART RATE: 85 BPM

## 2021-07-19 PROCEDURE — 99213 OFFICE O/P EST LOW 20 MIN: CPT

## 2021-07-19 RX ORDER — INSULIN ASPART 100 [IU]/ML
100 INJECTION, SOLUTION INTRAVENOUS; SUBCUTANEOUS
Qty: 3 | Refills: 3 | Status: DISCONTINUED | COMMUNITY
Start: 2018-04-01 | End: 2021-07-19

## 2021-07-19 RX ORDER — INSULIN DETEMIR 100 [IU]/ML
100 INJECTION, SOLUTION SUBCUTANEOUS
Refills: 0 | Status: DISCONTINUED | COMMUNITY
Start: 2018-04-01 | End: 2021-07-19

## 2021-07-19 NOTE — LETTER BODY
[Dear  ___] : Dear  [unfilled], [Consult Letter:] : I had the pleasure of evaluating your patient, [unfilled]. [Consult Closing:] : Thank you very much for allowing me to participate in the care of this patient.  If you have any questions, please do not hesitate to contact me. [FreeTextEntry1] : [facility]\par [address] \[place], [address]  \par [phone] \par

## 2021-07-19 NOTE — ASSESSMENT
[FreeTextEntry1] : Urinary symptoms are improved compared to before.  He is due for new PSA level.  Again we discussed significance of elevated PSA level.  He has never undergone biopsy.  He would rather evaluate further if possible with MRI of the prostate.  MRI of the prostate will be ordered pending insurance approval.  He will contact me for the results once is done.  Follow-up in 6 months.\par \par Joseph Butterfield MD, FACS\par [facility]\par  of Urology\par \par [address], [address]\[place] [address]\par \par [address]\[place] [address]\par \par Tel: [phone]\par Fax: [phone]

## 2021-07-19 NOTE — HISTORY OF PRESENT ILLNESS
[FreeTextEntry1] : He is a 66-year-old man who was seen today for elevated PSA level.  He was last seen in 2019.  Patient states that due to change in his medications, lower extremities edema has resolved and nocturia is only 1 time.  He has history of erectile dysfunction but not sexually very active.  He is supposed to have a PSA level done very soon.  The last PSA level was 4.8 in June 2019.  Hemoglobin A1c was 7.7 recently. Multiple prior PSA levels were 4.

## 2021-07-19 NOTE — PHYSICAL EXAM
[General Appearance - Well Developed] : well developed [General Appearance - Well Nourished] : well nourished [Normal Appearance] : normal appearance [Well Groomed] : well groomed [General Appearance - In No Acute Distress] : no acute distress [Abdomen Soft] : soft [Abdomen Tenderness] : non-tender [Costovertebral Angle Tenderness] : no ~M costovertebral angle tenderness [Urethral Meatus] : meatus normal [Urinary Bladder Findings] : the bladder was normal on palpation [Scrotum] : the scrotum was normal [Testes Mass (___cm)] : there were no testicular masses [Prostate Tenderness] : the prostate was not tender [No Prostate Nodules] : no prostate nodules [Prostate Enlarged] : was enlarged [] : no respiratory distress [Respiration, Rhythm And Depth] : normal respiratory rhythm and effort [Exaggerated Use Of Accessory Muscles For Inspiration] : no accessory muscle use

## 2021-08-07 ENCOUNTER — RESULT REVIEW (OUTPATIENT)
Age: 66
End: 2021-08-07

## 2021-08-07 ENCOUNTER — OUTPATIENT (OUTPATIENT)
Dept: OUTPATIENT SERVICES | Facility: HOSPITAL | Age: 66
LOS: 1 days | End: 2021-08-07
Payer: MEDICARE

## 2021-08-07 ENCOUNTER — APPOINTMENT (OUTPATIENT)
Dept: MRI IMAGING | Facility: IMAGING CENTER | Age: 66
End: 2021-08-07
Payer: MEDICARE

## 2021-08-07 DIAGNOSIS — Z98.89 OTHER SPECIFIED POSTPROCEDURAL STATES: Chronic | ICD-10-CM

## 2021-08-07 DIAGNOSIS — R97.20 ELEVATED PROSTATE SPECIFIC ANTIGEN [PSA]: ICD-10-CM

## 2021-08-07 PROCEDURE — G1004: CPT

## 2021-08-07 PROCEDURE — 76498 UNLISTED MR PROCEDURE: CPT | Mod: 26,MH

## 2021-08-07 PROCEDURE — 72197 MRI PELVIS W/O & W/DYE: CPT | Mod: 26,ME

## 2021-08-07 PROCEDURE — 72197 MRI PELVIS W/O & W/DYE: CPT | Mod: ME

## 2021-08-07 PROCEDURE — 76498 UNLISTED MR PROCEDURE: CPT

## 2021-08-07 PROCEDURE — A9585: CPT

## 2021-08-16 ENCOUNTER — APPOINTMENT (OUTPATIENT)
Dept: NEPHROLOGY | Facility: CLINIC | Age: 66
End: 2021-08-16
Payer: MEDICARE

## 2021-08-16 VITALS
DIASTOLIC BLOOD PRESSURE: 85 MMHG | WEIGHT: 209.44 LBS | BODY MASS INDEX: 31.74 KG/M2 | HEART RATE: 72 BPM | HEIGHT: 68 IN | TEMPERATURE: 79.5 F | SYSTOLIC BLOOD PRESSURE: 137 MMHG | OXYGEN SATURATION: 97 %

## 2021-08-16 PROCEDURE — 99214 OFFICE O/P EST MOD 30 MIN: CPT

## 2021-08-16 NOTE — ASSESSMENT
[FreeTextEntry1] : Elevated creatinine: Likely chronic kidney disease due to uncontrolled DM, tobacco use, HTN, hyperlipidemia.\par Will get repeat urinalysis and urine protein/creatinine. \par CKD risk factors- smoker, DM, HTN, Hyperlipidemia, Obesity\par Discussed current level of renal function  and its implications.\par Obesity: discussed weight loss, Ideal weight 158 Lbs\par Discussed again Renal Preservation strategies including achieving optimal weight through calory restriction and regular exercise, daily exercise, sleep hygiene, optimized control of glucose, blood pressure, lipids, avoidance of NSAIDs, Low Na and Low animal protein diet and medication adherence.\par DM: follows with Dr. Rudolph\par Hypertension, Hyperlipidemia: On f/u primary MD\par Tobacco use: discussed again smoking cessation and strongly advised to.\par COVID vaccination and precautions reviewed, he is fully vaccinated.\par F/u in 3 months

## 2021-08-16 NOTE — HISTORY OF PRESENT ILLNESS
[FreeTextEntry1] : Mr Collazo is here for follow up. Has no acute symptoms\par Here for f/u. Noted weight gain.\par Reviewed labs from prior visit.\par Still Smokes 3 cigarettes/day\par Not physically active\par no acute symptoms\par [facility] CGM and insulin pump. Reports good control of glucose.

## 2021-08-16 NOTE — PHYSICAL EXAM
[General Appearance - Alert] : alert [General Appearance - In No Acute Distress] : in no acute distress [Sclera] : the sclera and conjunctiva were normal [PERRL With Normal Accommodation] : pupils were equal in size, round, and reactive to light [Extraocular Movements] : extraocular movements were intact [Outer Ear] : the ears and nose were normal in appearance [Oropharynx] : the oropharynx was normal [Neck Cervical Mass (___cm)] : no neck mass was observed [Neck Appearance] : the appearance of the neck was normal [Jugular Venous Distention Increased] : there was no jugular-venous distention [Thyroid Diffuse Enlargement] : the thyroid was not enlarged [Thyroid Nodule] : there were no palpable thyroid nodules [Auscultation Breath Sounds / Voice Sounds] : lungs were clear to auscultation bilaterally [Heart Rate And Rhythm] : heart rate was normal and rhythm regular [Heart Sounds] : normal S1 and S2 [Heart Sounds Gallop] : no gallops [Murmurs] : no murmurs [Heart Sounds Pericardial Friction Rub] : no pericardial rub [Edema] : there was no peripheral edema [Full Pulse] : the pedal pulses are present [Bowel Sounds] : normal bowel sounds [Abdomen Soft] : soft [Abdomen Tenderness] : non-tender [Abdomen Mass (___ Cm)] : no abdominal mass palpated [Cervical Lymph Nodes Enlarged Posterior Bilaterally] : posterior cervical [Cervical Lymph Nodes Enlarged Anterior Bilaterally] : anterior cervical [Supraclavicular Lymph Nodes Enlarged Bilaterally] : supraclavicular [Femoral Lymph Nodes Enlarged Bilaterally] : femoral [Axillary Lymph Nodes Enlarged Bilaterally] : axillary [Inguinal Lymph Nodes Enlarged Bilaterally] : inguinal [Involuntary Movements] : no involuntary movements were seen [] : no rash [FreeTextEntry1] : tattoes [No Focal Deficits] : no focal deficits [Oriented To Time, Place, And Person] : oriented to person, place, and time [Impaired Insight] : insight and judgment were intact [Affect] : the affect was normal

## 2021-08-17 LAB
25(OH)D3 SERPL-MCNC: 35.1 NG/ML
ALBUMIN SERPL ELPH-MCNC: 4 G/DL
ALP BLD-CCNC: 105 U/L
ALT SERPL-CCNC: 16 U/L
ANION GAP SERPL CALC-SCNC: 16 MMOL/L
APPEARANCE: CLEAR
AST SERPL-CCNC: 16 U/L
BACTERIA: NEGATIVE
BASOPHILS # BLD AUTO: 0.09 K/UL
BASOPHILS NFR BLD AUTO: 1 %
BILIRUB SERPL-MCNC: 0.3 MG/DL
BILIRUBIN URINE: NEGATIVE
BLOOD URINE: ABNORMAL
BUN SERPL-MCNC: 22 MG/DL
CALCIUM OXALATE CRYSTALS: ABNORMAL
CALCIUM SERPL-MCNC: 9.4 MG/DL
CALCIUM SERPL-MCNC: 9.4 MG/DL
CHLORIDE SERPL-SCNC: 100 MMOL/L
CHOLEST SERPL-MCNC: 188 MG/DL
CO2 SERPL-SCNC: 22 MMOL/L
COLOR: YELLOW
CREAT SERPL-MCNC: 1.82 MG/DL
CREAT SPEC-SCNC: 185 MG/DL
CREAT/PROT UR: 2.4 RATIO
EOSINOPHIL # BLD AUTO: 0.6 K/UL
EOSINOPHIL NFR BLD AUTO: 6.3 %
GLUCOSE QUALITATIVE U: ABNORMAL
GLUCOSE SERPL-MCNC: 197 MG/DL
HCT VFR BLD CALC: 42.8 %
HDLC SERPL-MCNC: 48 MG/DL
HGB BLD-MCNC: 14 G/DL
HYALINE CASTS: 6 /LPF
IMM GRANULOCYTES NFR BLD AUTO: 0.2 %
KETONES URINE: NEGATIVE
LDLC SERPL CALC-MCNC: 83 MG/DL
LEUKOCYTE ESTERASE URINE: NEGATIVE
LYMPHOCYTES # BLD AUTO: 2.1 K/UL
LYMPHOCYTES NFR BLD AUTO: 22.2 %
MAGNESIUM SERPL-MCNC: 2.3 MG/DL
MAN DIFF?: NORMAL
MCHC RBC-ENTMCNC: 30.8 PG
MCHC RBC-ENTMCNC: 32.7 GM/DL
MCV RBC AUTO: 94.3 FL
MICROSCOPIC-UA: NORMAL
MONOCYTES # BLD AUTO: 0.85 K/UL
MONOCYTES NFR BLD AUTO: 9 %
NEUTROPHILS # BLD AUTO: 5.81 K/UL
NEUTROPHILS NFR BLD AUTO: 61.3 %
NITRITE URINE: NEGATIVE
NONHDLC SERPL-MCNC: 140 MG/DL
PARATHYROID HORMONE INTACT: 14 PG/ML
PH URINE: 6.5
PHOSPHATE SERPL-MCNC: 2.7 MG/DL
PLATELET # BLD AUTO: 216 K/UL
POTASSIUM SERPL-SCNC: 4.3 MMOL/L
PROT SERPL-MCNC: 6.4 G/DL
PROT UR-MCNC: 442 MG/DL
PROTEIN URINE: ABNORMAL
RBC # BLD: 4.54 M/UL
RBC # FLD: 12.5 %
RED BLOOD CELLS URINE: 113 /HPF
SODIUM SERPL-SCNC: 138 MMOL/L
SPECIFIC GRAVITY URINE: 1.02
SQUAMOUS EPITHELIAL CELLS: 1 /HPF
TRIGL SERPL-MCNC: 281 MG/DL
URATE SERPL-MCNC: 5.4 MG/DL
UROBILINOGEN URINE: NORMAL
WBC # FLD AUTO: 9.47 K/UL
WHITE BLOOD CELLS URINE: 4 /HPF

## 2021-11-22 ENCOUNTER — APPOINTMENT (OUTPATIENT)
Dept: NEPHROLOGY | Facility: CLINIC | Age: 66
End: 2021-11-22
Payer: MEDICARE

## 2021-11-22 VITALS — DIASTOLIC BLOOD PRESSURE: 90 MMHG | SYSTOLIC BLOOD PRESSURE: 138 MMHG

## 2021-11-22 VITALS
SYSTOLIC BLOOD PRESSURE: 151 MMHG | DIASTOLIC BLOOD PRESSURE: 91 MMHG | HEART RATE: 79 BPM | HEIGHT: 68 IN | WEIGHT: 207.23 LBS | BODY MASS INDEX: 31.41 KG/M2 | TEMPERATURE: 97.5 F | OXYGEN SATURATION: 98 %

## 2021-11-22 PROCEDURE — 99214 OFFICE O/P EST MOD 30 MIN: CPT

## 2021-11-22 NOTE — ASSESSMENT
[FreeTextEntry1] : Elevated creatinine: Likely chronic kidney disease due to uncontrolled DM, tobacco use, HTN, hyperlipidemia.\par Will get repeat urinalysis and urine protein/creatinine. Repeat labs\par CKD risk factors- smoker, DM, HTN, Hyperlipidemia, Obesity\par Discussed current level of renal function  and its implications.\par Obesity: discussed weight loss, Ideal weight 158 Lbs\par Discussed again Renal Preservation strategies including achieving optimal weight through calory restriction and regular exercise, daily exercise, sleep hygiene, optimized control of glucose, blood pressure, lipids, avoidance of NSAIDs, Low Na and Low animal protein diet and medication adherence.\par DM: follows with Dr. Rudolph\par Hypertension, Hyperlipidemia: On f/u primary MD\par Tobacco use: discussed again smoking cessation and strongly advised to.\par COVID vaccination and precautions reviewed, he is fully vaccinated.\par He has had Flu vaccine and COVID booster as well as Shingrix.\par F/u in 3 months

## 2021-11-22 NOTE — REASON FOR VISIT
[Follow-Up] : a follow-up visit [FreeTextEntry1] : follow up, Chronic kidney disease,  no acute symptoms

## 2021-11-22 NOTE — HISTORY OF PRESENT ILLNESS
[FreeTextEntry1] : Mr Collazo is here for follow up. Has no acute symptoms\par Here for f/u. Uses CGM.\par Reviewed labs from prior visit.\par Still Smokes 3 cigarettes/day\par He reports being more  physically active walking his Shiba Inu dog.\par no acute symptoms\par [facility] CGM and insulin pump. Reports good control of glucose.

## 2021-11-22 NOTE — PHYSICAL EXAM
[General Appearance - Alert] : alert [General Appearance - In No Acute Distress] : in no acute distress [Sclera] : the sclera and conjunctiva were normal [PERRL With Normal Accommodation] : pupils were equal in size, round, and reactive to light [Extraocular Movements] : extraocular movements were intact [Outer Ear] : the ears and nose were normal in appearance [Oropharynx] : the oropharynx was normal [Neck Appearance] : the appearance of the neck was normal [Neck Cervical Mass (___cm)] : no neck mass was observed [Jugular Venous Distention Increased] : there was no jugular-venous distention [Thyroid Diffuse Enlargement] : the thyroid was not enlarged [Thyroid Nodule] : there were no palpable thyroid nodules [Auscultation Breath Sounds / Voice Sounds] : lungs were clear to auscultation bilaterally [Heart Rate And Rhythm] : heart rate was normal and rhythm regular [Heart Sounds] : normal S1 and S2 [Heart Sounds Gallop] : no gallops [Murmurs] : no murmurs [Heart Sounds Pericardial Friction Rub] : no pericardial rub [Full Pulse] : the pedal pulses are present [Edema] : there was no peripheral edema [Bowel Sounds] : normal bowel sounds [Abdomen Soft] : soft [Abdomen Tenderness] : non-tender [Abdomen Mass (___ Cm)] : no abdominal mass palpated [Cervical Lymph Nodes Enlarged Posterior Bilaterally] : posterior cervical [Cervical Lymph Nodes Enlarged Anterior Bilaterally] : anterior cervical [Supraclavicular Lymph Nodes Enlarged Bilaterally] : supraclavicular [Axillary Lymph Nodes Enlarged Bilaterally] : axillary [Inguinal Lymph Nodes Enlarged Bilaterally] : inguinal [Femoral Lymph Nodes Enlarged Bilaterally] : femoral [Involuntary Movements] : no involuntary movements were seen [] : no rash [No Focal Deficits] : no focal deficits [Oriented To Time, Place, And Person] : oriented to person, place, and time [Impaired Insight] : insight and judgment were intact [Affect] : the affect was normal [FreeTextEntry1] : tattoes

## 2021-11-29 LAB
25(OH)D3 SERPL-MCNC: 39 NG/ML
ALBUMIN SERPL ELPH-MCNC: 4 G/DL
ALP BLD-CCNC: 83 U/L
ALT SERPL-CCNC: 16 U/L
ANION GAP SERPL CALC-SCNC: 13 MMOL/L
AST SERPL-CCNC: 18 U/L
BASOPHILS # BLD AUTO: 0.06 K/UL
BASOPHILS NFR BLD AUTO: 0.8 %
BILIRUB SERPL-MCNC: 0.2 MG/DL
BUN SERPL-MCNC: 26 MG/DL
CALCIUM SERPL-MCNC: 9.7 MG/DL
CALCIUM SERPL-MCNC: 9.7 MG/DL
CHLORIDE SERPL-SCNC: 106 MMOL/L
CHOLEST SERPL-MCNC: 187 MG/DL
CO2 SERPL-SCNC: 23 MMOL/L
CREAT SERPL-MCNC: 1.9 MG/DL
CREAT SPEC-SCNC: 190 MG/DL
CREAT/PROT UR: 2.2 RATIO
EOSINOPHIL # BLD AUTO: 0.56 K/UL
EOSINOPHIL NFR BLD AUTO: 7.4 %
ESTIMATED AVERAGE GLUCOSE: 177 MG/DL
GLUCOSE SERPL-MCNC: 109 MG/DL
HBA1C MFR BLD HPLC: 7.8 %
HCT VFR BLD CALC: 40.1 %
HDLC SERPL-MCNC: 48 MG/DL
HGB BLD-MCNC: 13.1 G/DL
IMM GRANULOCYTES NFR BLD AUTO: 0.3 %
LDLC SERPL CALC-MCNC: 96 MG/DL
LYMPHOCYTES # BLD AUTO: 1.86 K/UL
LYMPHOCYTES NFR BLD AUTO: 24.6 %
MAGNESIUM SERPL-MCNC: 2.3 MG/DL
MAN DIFF?: NORMAL
MCHC RBC-ENTMCNC: 30.4 PG
MCHC RBC-ENTMCNC: 32.7 GM/DL
MCV RBC AUTO: 93 FL
MONOCYTES # BLD AUTO: 0.74 K/UL
MONOCYTES NFR BLD AUTO: 9.8 %
NEUTROPHILS # BLD AUTO: 4.33 K/UL
NEUTROPHILS NFR BLD AUTO: 57.1 %
NONHDLC SERPL-MCNC: 140 MG/DL
PARATHYROID HORMONE INTACT: 16 PG/ML
PHOSPHATE SERPL-MCNC: 2.8 MG/DL
PLATELET # BLD AUTO: 224 K/UL
POTASSIUM SERPL-SCNC: 4.3 MMOL/L
PROT SERPL-MCNC: 6.5 G/DL
PROT UR-MCNC: 414 MG/DL
RBC # BLD: 4.31 M/UL
RBC # FLD: 12.5 %
SODIUM SERPL-SCNC: 142 MMOL/L
TRIGL SERPL-MCNC: 219 MG/DL
URATE SERPL-MCNC: 6.7 MG/DL
WBC # FLD AUTO: 7.57 K/UL

## 2021-12-14 ENCOUNTER — APPOINTMENT (OUTPATIENT)
Dept: OTOLARYNGOLOGY | Facility: CLINIC | Age: 66
End: 2021-12-14
Payer: MEDICARE

## 2021-12-14 VITALS
BODY MASS INDEX: 31.37 KG/M2 | HEIGHT: 68 IN | WEIGHT: 207 LBS | TEMPERATURE: 97.5 F | DIASTOLIC BLOOD PRESSURE: 94 MMHG | HEART RATE: 71 BPM | SYSTOLIC BLOOD PRESSURE: 161 MMHG

## 2021-12-14 DIAGNOSIS — J34.89 OTHER SPECIFIED DISORDERS OF NOSE AND NASAL SINUSES: ICD-10-CM

## 2021-12-14 PROCEDURE — 31231 NASAL ENDOSCOPY DX: CPT

## 2021-12-14 PROCEDURE — 99204 OFFICE O/P NEW MOD 45 MIN: CPT | Mod: 25

## 2021-12-14 RX ORDER — AZELASTINE HYDROCHLORIDE 137 UG/1
137 SPRAY, METERED NASAL
Qty: 1 | Refills: 3 | Status: ACTIVE | COMMUNITY
Start: 2021-12-14 | End: 1900-01-01

## 2021-12-14 NOTE — ASSESSMENT
[FreeTextEntry1] : nasal obstruction \par - add astelin \par - r/b/a d/w patient re: allergy testing vs surgical intervention.  he would like to try to avoid surgery. had allergy testing. + for numerous allergies\par - f/up 6 weeks will reassess medication regiment \par - already had negative PST

## 2021-12-14 NOTE — PHYSICAL EXAM
[Nasal Endoscopy Performed] : nasal endoscopy was performed, see procedure section for findings [Midline] : trachea located in midline position [Normal] : no rashes [] : septum deviated to the left

## 2021-12-14 NOTE — CONSULT LETTER
[Dear  ___] : Dear  [unfilled], [Consult Letter:] : I had the pleasure of evaluating your patient, [unfilled]. [Please see my note below.] : Please see my note below. [Consult Closing:] : Thank you very much for allowing me to participate in the care of this patient.  If you have any questions, please do not hesitate to contact me. [FreeTextEntry3] : Sincerely, \par \par Ofelia Hoyos MD\par Otolaryngology- Facial Plastics \par [address]\[place] [address]\par (P) - [phone]\par (F) - [phone]

## 2021-12-14 NOTE — PROCEDURE
[FreeTextEntry6] : reason for exam: anterior rhinoscopy insufficient for symptom evaluation \par \par Fiberoptic nasal endoscopy was performed.  R/b/a of procedure was explained to the patient and they agreed to proceed with procedure.  Nasal cavities were treated with afrin and lidocaine prior to nasal endoscopy to make the patient more comfortable. B/l inferior turbinate hypertrophy, severe with edematous mucosa.  Remainder of exam normal, including b/l middle turbinates, superior turbinates, inferior, middle and superior meati and sphenoethmoidal recess.  No nasal polyps.  Septum deviated to the left\par scope #: 30

## 2021-12-14 NOTE — HISTORY OF PRESENT ILLNESS
[de-identified] : Mr. MICHAELS is a 66 year male with nasal obstruction when he lays down on his left, completely improved several minuted after laying down on his right.  \par symptoms present for 1 year \par + sneezing in the AM, sometimes 10-15 mins, improved after blowing nose.  Has clear fluid which he can blow out during that time.  \par no nasal obstruction during the day. \par has been on flonase daily for 1 year and breathe rite strips with no significant improvement. \par  denies sinus pressure, rhinorrhea, epistaxis \par had sleep study which was reportedly negative for RACIEL

## 2022-01-01 NOTE — ED PROVIDER NOTE - OBJECTIVE STATEMENT
Subjective:       History was provided by the mother and discharge paperwork faxed from [facility] .    Bobo Rodriges is a 5 days male who was brought in for this well child visit.    Mother's name: Jasiel  Father's name: Kiffy   Current Issues:  Current concerns include: 39W0d male born to a  mom via  at [facility].  Maternal history of HTN and obesity.  Maternal labs negative, meconium staining at delivery, APGARS 8,9, maternal blood type A+, antibody negative. Breastfeeding on demand every 2 hours, having yellow seedy stools.    Review of  Issues:  Known potentially teratogenic medications used during pregnancy? no  Alcohol during pregnancy? no  Tobacco during pregnancy? no  Other drugs during pregnancy? no  Other complications during pregnancy, labor, or delivery? no  Was mom Hepatitis B surface antigen positive? no    Review of Nutrition:  Current diet: breastfeeding prn  Current feeding patterns: on demand q 2 hours  Difficulties with feeding? no  Current stooling frequency: with every feeding    Social Screening:  Current child-care arrangements: in home: primary caregiver is mother  Sibling relations: brothers: 1  Parental coping and self-care: doing well; no concerns  Secondhand smoke exposure? no    Growth parameters: Noted and are appropriate for age.    -1%     Review of Systems  Pertinent items are noted in HPI      Objective:        General:   Well appearing , alert and crying but consolable   Skin:   normal   Head:   normal fontanelles, normal appearance, and normal palate   Eyes:   sclerae white   Ears:   normal bilaterally   Mouth:   normal   Lungs:   clear to auscultation bilaterally   Heart:   regular rate and rhythm, S1, S2 normal, no murmur, click, rub or gallop   Abdomen:   soft, non-tender; bowel sounds normal; no masses,  no organomegaly   Cord stump:  cord stump present   Screening DDH:   Ortolani's and Duke's signs absent bilaterally, leg length symmetrical, and  thigh & gluteal folds symmetrical   :   normal male - testes descended bilaterally and circumcised   Femoral pulses:   present bilaterally   Extremities:   extremities normal, atraumatic, no cyanosis or edema   Neuro:   alert and moves all extremities spontaneously        Assessment:      Healthy 5 days male infant.     Plan:      1. Anticipatory guidance discussed.  Gave handout on well-child issues at this age.  Specific topics reviewed: adequate diet for breastfeeding, call for jaundice, decreased feeding, or fever, normal crying, and typical  feeding habits.    2. Screening tests:   a. State  metabolic screen: pending  b. Hearing screen (OAE, ABR): negative    3. . Immunizations today:  UTD, had Hep B in nursery   61 yo M c/o feeling lightheaded and tunnel vision while standing in office, bystanders describes sudden loss of consciousness, pt fell forward and hit L forehead, awoke after several seconds, no only c/o mild dull frontal headache and diffuse posterior neck pain, no nausea or AMS or confusion, no chest pain/discomfort, no SOB/dyspnea, equal distal pulses, no diaphoresis, no abdominal pain, no h/o syncope.

## 2022-01-24 ENCOUNTER — APPOINTMENT (OUTPATIENT)
Dept: UROLOGY | Facility: CLINIC | Age: 67
End: 2022-01-24
Payer: MEDICARE

## 2022-01-24 VITALS
SYSTOLIC BLOOD PRESSURE: 150 MMHG | OXYGEN SATURATION: 97 % | RESPIRATION RATE: 13 BRPM | HEART RATE: 84 BPM | BODY MASS INDEX: 31.37 KG/M2 | HEIGHT: 68 IN | DIASTOLIC BLOOD PRESSURE: 90 MMHG | WEIGHT: 207 LBS | TEMPERATURE: 97.1 F

## 2022-01-24 PROCEDURE — 99213 OFFICE O/P EST LOW 20 MIN: CPT

## 2022-01-24 NOTE — HISTORY OF PRESENT ILLNESS
[FreeTextEntry1] : He is a 66-year-old man who was seen today for elevated PSA level, enlarged prostate and urinary symptoms.  He is on tamsulosin.  Nocturia is once.  Residual urine today was about 30 cc.  He is due for PSA level.  MRI of the prostate in August 2021 showed 117 g prostate and no MRI suspicious lesions.  Creatinine level was 1.9.  PSA level was 4.6 in March 2020 and A1c level was 7.8.  He has not had a prior prostate biopsy.\par \par He has history of erectile dysfunction but not sexually very active.  PSA level was 4.8 in June 2019. Multiple prior PSA levels were 4.

## 2022-01-24 NOTE — PHYSICAL EXAM
[General Appearance - Well Developed] : well developed [General Appearance - Well Nourished] : well nourished [Normal Appearance] : normal appearance [Well Groomed] : well groomed [General Appearance - In No Acute Distress] : no acute distress [Abdomen Soft] : soft [Abdomen Tenderness] : non-tender [Costovertebral Angle Tenderness] : no ~M costovertebral angle tenderness [Urethral Meatus] : meatus normal [Urinary Bladder Findings] : the bladder was normal on palpation [Scrotum] : the scrotum was normal [Testes Mass (___cm)] : there were no testicular masses [Prostate Tenderness] : the prostate was not tender [No Prostate Nodules] : no prostate nodules [Prostate Enlarged] : was enlarged [FreeTextEntry1] : Prostate examined in July 2021 [] : no respiratory distress [Respiration, Rhythm And Depth] : normal respiratory rhythm and effort [Exaggerated Use Of Accessory Muscles For Inspiration] : no accessory muscle use [Oriented To Time, Place, And Person] : oriented to person, place, and time [Affect] : the affect was normal [Mood] : the mood was normal [Not Anxious] : not anxious

## 2022-01-24 NOTE — LETTER BODY
[Dear  ___] : Dear  [unfilled], [Consult Letter:] : I had the pleasure of evaluating your patient, [unfilled]. [Consult Closing:] : Thank you very much for allowing me to participate in the care of this patient.  If you have any questions, please do not hesitate to contact me. [FreeTextEntry1] : [facility]\par Address: [address], \[place] [address]\par [phone]

## 2022-01-24 NOTE — ASSESSMENT
[FreeTextEntry1] : He has an enlarged prostate and urinary symptoms are controlled with tamsulosin.  Nocturia is minimal and the residual urine volume is also acceptable.  He has an enlarged prostate based on MRI but no obviously suspicious lesions.  One option would be to continue monitoring closely with PSA levels every 6 months.  False-negative rate of MRI has been discussed.  Alternatively random prostate biopsy could be considered.  For now, pending PSA level, he will follow-up in 6 months.\par \par Joseph Butterfield MD, FACS\par [facility]  of Urology\[facility] \[facility] [address], [address]\[place] [address]\[facility] \[facility] [address]\[place] [address]\[facility] \[facility] Tel: [phone]\par Fax: [phone]

## 2022-01-25 LAB — PSA SERPL-MCNC: 5.75 NG/ML

## 2022-06-14 ENCOUNTER — APPOINTMENT (OUTPATIENT)
Dept: DERMATOLOGY | Facility: CLINIC | Age: 67
End: 2022-06-14

## 2022-07-27 ENCOUNTER — APPOINTMENT (OUTPATIENT)
Dept: UROLOGY | Facility: CLINIC | Age: 67
End: 2022-07-27

## 2022-07-27 VITALS
DIASTOLIC BLOOD PRESSURE: 79 MMHG | HEIGHT: 68 IN | HEART RATE: 78 BPM | SYSTOLIC BLOOD PRESSURE: 144 MMHG | OXYGEN SATURATION: 97 % | WEIGHT: 200 LBS | BODY MASS INDEX: 30.31 KG/M2 | RESPIRATION RATE: 17 BRPM | TEMPERATURE: 97.7 F

## 2022-07-27 PROCEDURE — 99213 OFFICE O/P EST LOW 20 MIN: CPT

## 2022-07-27 NOTE — PHYSICAL EXAM
[Urethral Meatus] : meatus normal [Urinary Bladder Findings] : the bladder was normal on palpation [Scrotum] : the scrotum was normal [Testes Mass (___cm)] : there were no testicular masses [Prostate Tenderness] : the prostate was not tender [No Prostate Nodules] : no prostate nodules [Prostate Enlarged] : was enlarged [General Appearance - Well Developed] : well developed [General Appearance - Well Nourished] : well nourished [Normal Appearance] : normal appearance [Well Groomed] : well groomed [General Appearance - In No Acute Distress] : no acute distress [Abdomen Soft] : soft [Abdomen Tenderness] : non-tender [Costovertebral Angle Tenderness] : no ~M costovertebral angle tenderness [FreeTextEntry1] : Prostate examined in 2021 [] : no respiratory distress [Respiration, Rhythm And Depth] : normal respiratory rhythm and effort [Exaggerated Use Of Accessory Muscles For Inspiration] : no accessory muscle use [Oriented To Time, Place, And Person] : oriented to person, place, and time [Affect] : the affect was normal [Mood] : the mood was normal [Not Anxious] : not anxious

## 2022-07-27 NOTE — HISTORY OF PRESENT ILLNESS
[FreeTextEntry1] : He is a 67-year-old man who was seen today for elevated PSA level, enlarged prostate and urinary symptoms.  Nocturia seems to be relatively satisfactory which is once or twice.  There is no hematuria or dysuria.  He is using tamsulosin once a night.  He was told recently that the A1c level was around 7.  In January 2022, PSA level was 5.75.  He is repeating it today.  Residual urine volume today was minimal.\par \par Previous notes: MRI of the prostate in August 2021 showed 117 g prostate and no MRI suspicious lesions.  Creatinine level was 1.9.  PSA level was 4.6 in March 2020 and A1c level was 7.8.  PSA level was 4.8 in June 2019. Multiple prior PSA levels were 4. He has not had a prior prostate biopsy. He has history of erectile dysfunction but not sexually very active.

## 2022-07-27 NOTE — LETTER BODY
[Dear  ___] : Dear  [unfilled], [Consult Letter:] : I had the pleasure of evaluating your patient, [unfilled]. [Consult Closing:] : Thank you very much for allowing me to participate in the care of this patient.  If you have any questions, please do not hesitate to contact me. [FreeTextEntry1] : [facility]\par Address: [address], \[place] [address]\par [phone]

## 2022-07-28 ENCOUNTER — NON-APPOINTMENT (OUTPATIENT)
Age: 67
End: 2022-07-28

## 2022-07-28 LAB
PSA FREE FLD-MCNC: 45 %
PSA FREE SERPL-MCNC: 2.32 NG/ML
PSA SERPL-MCNC: 5.19 NG/ML

## 2022-10-17 ENCOUNTER — APPOINTMENT (OUTPATIENT)
Dept: NEPHROLOGY | Facility: CLINIC | Age: 67
End: 2022-10-17

## 2022-10-17 VITALS
DIASTOLIC BLOOD PRESSURE: 81 MMHG | HEART RATE: 67 BPM | SYSTOLIC BLOOD PRESSURE: 136 MMHG | WEIGHT: 210 LBS | BODY MASS INDEX: 31.83 KG/M2 | OXYGEN SATURATION: 97 % | HEIGHT: 68 IN | TEMPERATURE: 97.2 F

## 2022-10-17 PROCEDURE — 99214 OFFICE O/P EST MOD 30 MIN: CPT

## 2022-10-17 NOTE — HISTORY OF PRESENT ILLNESS
[FreeTextEntry1] : Mr Collazo is here for follow up. Has no acute symptoms\par Uses CGM.\par No acute symptoms.\par Reviewed labs from prior visit.\par Still Smokes 1 pack/week, 2-3 cig/day.\par He reports being more  physically active walking his Shiba Inu dog.\par no acute symptoms\par [facility] CGM and insulin pump. Reports good control of glucose.

## 2022-10-17 NOTE — PHYSICAL EXAM
[General Appearance - Alert] : alert [General Appearance - In No Acute Distress] : in no acute distress [Sclera] : the sclera and conjunctiva were normal [PERRL With Normal Accommodation] : pupils were equal in size, round, and reactive to light [Extraocular Movements] : extraocular movements were intact [Outer Ear] : the ears and nose were normal in appearance [Oropharynx] : the oropharynx was normal [Neck Appearance] : the appearance of the neck was normal [Neck Cervical Mass (___cm)] : no neck mass was observed [Jugular Venous Distention Increased] : there was no jugular-venous distention [Thyroid Diffuse Enlargement] : the thyroid was not enlarged [Thyroid Nodule] : there were no palpable thyroid nodules [Auscultation Breath Sounds / Voice Sounds] : lungs were clear to auscultation bilaterally [Heart Rate And Rhythm] : heart rate was normal and rhythm regular [Heart Sounds] : normal S1 and S2 [Heart Sounds Gallop] : no gallops [Murmurs] : no murmurs [Heart Sounds Pericardial Friction Rub] : no pericardial rub [Full Pulse] : the pedal pulses are present [Edema] : there was no peripheral edema [Bowel Sounds] : normal bowel sounds [Abdomen Soft] : soft [Abdomen Tenderness] : non-tender [Abdomen Mass (___ Cm)] : no abdominal mass palpated [Cervical Lymph Nodes Enlarged Posterior Bilaterally] : posterior cervical [Cervical Lymph Nodes Enlarged Anterior Bilaterally] : anterior cervical [Supraclavicular Lymph Nodes Enlarged Bilaterally] : supraclavicular [Axillary Lymph Nodes Enlarged Bilaterally] : axillary [Femoral Lymph Nodes Enlarged Bilaterally] : femoral [Inguinal Lymph Nodes Enlarged Bilaterally] : inguinal [Involuntary Movements] : no involuntary movements were seen [] : no rash [FreeTextEntry1] : tattoes [No Focal Deficits] : no focal deficits [Oriented To Time, Place, And Person] : oriented to person, place, and time [Impaired Insight] : insight and judgment were intact [Affect] : the affect was normal

## 2022-10-18 LAB
25(OH)D3 SERPL-MCNC: 31.9 NG/ML
ALBUMIN SERPL ELPH-MCNC: 3.9 G/DL
ALP BLD-CCNC: 97 U/L
ALT SERPL-CCNC: 14 U/L
ANION GAP SERPL CALC-SCNC: 15 MMOL/L
APPEARANCE: CLEAR
AST SERPL-CCNC: 15 U/L
BACTERIA: NEGATIVE
BASOPHILS # BLD AUTO: 0.06 K/UL
BASOPHILS NFR BLD AUTO: 0.7 %
BILIRUB SERPL-MCNC: 0.4 MG/DL
BILIRUBIN URINE: NEGATIVE
BLOOD URINE: ABNORMAL
BUN SERPL-MCNC: 22 MG/DL
CALCIUM SERPL-MCNC: 9.2 MG/DL
CALCIUM SERPL-MCNC: 9.2 MG/DL
CHLORIDE SERPL-SCNC: 102 MMOL/L
CHOLEST SERPL-MCNC: 173 MG/DL
CO2 SERPL-SCNC: 24 MMOL/L
COLOR: YELLOW
CREAT SERPL-MCNC: 1.96 MG/DL
CREAT SPEC-SCNC: 262 MG/DL
CREAT/PROT UR: 2.1 RATIO
EGFR: 37 ML/MIN/1.73M2
EOSINOPHIL # BLD AUTO: 0.52 K/UL
EOSINOPHIL NFR BLD AUTO: 6.4 %
ESTIMATED AVERAGE GLUCOSE: 160 MG/DL
GLUCOSE QUALITATIVE U: ABNORMAL
GLUCOSE SERPL-MCNC: 187 MG/DL
HBA1C MFR BLD HPLC: 7.2 %
HCT VFR BLD CALC: 41.3 %
HDLC SERPL-MCNC: 41 MG/DL
HGB BLD-MCNC: 14 G/DL
HYALINE CASTS: 1 /LPF
IMM GRANULOCYTES NFR BLD AUTO: 0.2 %
KETONES URINE: NEGATIVE
LDLC SERPL CALC-MCNC: 68 MG/DL
LEUKOCYTE ESTERASE URINE: NEGATIVE
LYMPHOCYTES # BLD AUTO: 1.98 K/UL
LYMPHOCYTES NFR BLD AUTO: 24.3 %
MAGNESIUM SERPL-MCNC: 2.2 MG/DL
MAN DIFF?: NORMAL
MCHC RBC-ENTMCNC: 31.5 PG
MCHC RBC-ENTMCNC: 33.9 GM/DL
MCV RBC AUTO: 92.8 FL
MICROSCOPIC-UA: NORMAL
MONOCYTES # BLD AUTO: 0.76 K/UL
MONOCYTES NFR BLD AUTO: 9.3 %
NEUTROPHILS # BLD AUTO: 4.81 K/UL
NEUTROPHILS NFR BLD AUTO: 59.1 %
NITRITE URINE: NEGATIVE
NONHDLC SERPL-MCNC: 132 MG/DL
PARATHYROID HORMONE INTACT: 37 PG/ML
PH URINE: 6
PHOSPHATE SERPL-MCNC: 2.6 MG/DL
PLATELET # BLD AUTO: 230 K/UL
POTASSIUM SERPL-SCNC: 4.6 MMOL/L
PROT SERPL-MCNC: 6.4 G/DL
PROT UR-MCNC: 559 MG/DL
PROTEIN URINE: ABNORMAL
RBC # BLD: 4.45 M/UL
RBC # FLD: 12.7 %
RED BLOOD CELLS URINE: 15 /HPF
SODIUM SERPL-SCNC: 141 MMOL/L
SPECIFIC GRAVITY URINE: 1.03
SQUAMOUS EPITHELIAL CELLS: 1 /HPF
TRIGL SERPL-MCNC: 319 MG/DL
URATE SERPL-MCNC: 5.9 MG/DL
UROBILINOGEN URINE: NORMAL
WBC # FLD AUTO: 8.15 K/UL
WHITE BLOOD CELLS URINE: 2 /HPF

## 2023-07-17 ENCOUNTER — APPOINTMENT (OUTPATIENT)
Dept: UROLOGY | Facility: CLINIC | Age: 68
End: 2023-07-17
Payer: COMMERCIAL

## 2023-07-17 VITALS
TEMPERATURE: 97.3 F | DIASTOLIC BLOOD PRESSURE: 70 MMHG | HEIGHT: 68 IN | HEART RATE: 74 BPM | WEIGHT: 210 LBS | RESPIRATION RATE: 16 BRPM | SYSTOLIC BLOOD PRESSURE: 124 MMHG | OXYGEN SATURATION: 97 % | BODY MASS INDEX: 31.83 KG/M2

## 2023-07-17 DIAGNOSIS — R97.20 ELEVATED PROSTATE, SPECIFIC ANTIGEN [PSA]: ICD-10-CM

## 2023-07-17 DIAGNOSIS — R35.1 BENIGN PROSTATIC HYPERPLASIA WITH LOWER URINARY TRACT SYMPMS: ICD-10-CM

## 2023-07-17 DIAGNOSIS — N40.1 BENIGN PROSTATIC HYPERPLASIA WITH LOWER URINARY TRACT SYMPMS: ICD-10-CM

## 2023-07-17 PROCEDURE — 99213 OFFICE O/P EST LOW 20 MIN: CPT

## 2023-07-17 NOTE — HISTORY OF PRESENT ILLNESS
[FreeTextEntry1] : He is a 68 year-old man who was seen today for elevated PSA level, enlarged prostate and urinary symptoms.  He is not bothered by urinary symptoms and nocturia is minimal or only once.  He has no hematuria or dysuria.  There is no flank pain.  PSA level is being repeated today.  In July 2022 PSA level was 5.19 with 45% free PSA.  He is on tamsulosin 0.4 mg once a night.  He believes that the PSA levels are around 7.  Residual urine volume previously was minimal.\par \par Previous notes: MRI of the prostate in August 2021 showed 117 g prostate and no MRI suspicious lesions.  Creatinine level was 1.9.  He has not had a prior prostate biopsy. He has history of erectile dysfunction but not sexually very active.

## 2023-07-17 NOTE — ASSESSMENT
[FreeTextEntry1] : His examination is unchanged.  He is emptying his bladder well.  Nocturia is minimal.  Residual urine volume is also minimal.  Free and total PSA levels and prostate MRI were discussed.  PSA level will be repeated and he can follow-up in 1 year pending results.\par \par Joseph Butterfield MD, FACS\par [facility]\par  of Urology\par \par [address], [address]\[place] [address]\par \par [address], [address]\[place] [address]\par \par Tel: [phone]\par Fax: [phone]

## 2023-07-17 NOTE — LETTER BODY
[Dear  ___] : Dear  [unfilled], [Consult Letter:] : I had the pleasure of evaluating your patient, [unfilled]. [Consult Closing:] : Thank you very much for allowing me to participate in the care of this patient.  If you have any questions, please do not hesitate to contact me. [FreeTextEntry1] : [facility]\par Address: [address], \[place] [address]\par [phone]

## 2023-07-18 ENCOUNTER — NON-APPOINTMENT (OUTPATIENT)
Age: 68
End: 2023-07-18

## 2023-07-18 LAB — PSA SERPL-MCNC: 6.11 NG/ML

## 2023-10-30 ENCOUNTER — APPOINTMENT (OUTPATIENT)
Dept: NEPHROLOGY | Facility: CLINIC | Age: 68
End: 2023-10-30
Payer: MEDICARE

## 2023-10-30 DIAGNOSIS — E78.5 HYPERLIPIDEMIA, UNSPECIFIED: ICD-10-CM

## 2023-10-30 DIAGNOSIS — N18.30 CHRONIC KIDNEY DISEASE, STAGE 3 UNSPECIFIED: ICD-10-CM

## 2023-10-30 DIAGNOSIS — E11.9 TYPE 2 DIABETES MELLITUS W/OUT COMPLICATIONS: ICD-10-CM

## 2023-10-30 DIAGNOSIS — I10 ESSENTIAL (PRIMARY) HYPERTENSION: ICD-10-CM

## 2023-10-30 PROCEDURE — 99214 OFFICE O/P EST MOD 30 MIN: CPT

## 2023-11-20 LAB
25(OH)D3 SERPL-MCNC: 41.2 NG/ML
ALBUMIN SERPL ELPH-MCNC: 4.3 G/DL
ALP BLD-CCNC: 91 U/L
ALT SERPL-CCNC: 15 U/L
ANION GAP SERPL CALC-SCNC: 10 MMOL/L
APPEARANCE: CLEAR
AST SERPL-CCNC: 17 U/L
BACTERIA: NEGATIVE /HPF
BILIRUB SERPL-MCNC: 0.3 MG/DL
BILIRUBIN URINE: NEGATIVE
BLOOD URINE: NEGATIVE
BUN SERPL-MCNC: 23 MG/DL
CALCIUM SERPL-MCNC: 9.7 MG/DL
CALCIUM SERPL-MCNC: 9.7 MG/DL
CAST: 3 /LPF
CHLORIDE SERPL-SCNC: 103 MMOL/L
CHOLEST SERPL-MCNC: 143 MG/DL
CO2 SERPL-SCNC: 24 MMOL/L
COLOR: NORMAL
CREAT SERPL-MCNC: 2.15 MG/DL
CREAT SPEC-SCNC: 245 MG/DL
CREAT SPEC-SCNC: 245 MG/DL
CREAT/PROT UR: 0.5 RATIO
EGFR: 33 ML/MIN/1.73M2
EPITHELIAL CELLS: 0 /HPF
ESTIMATED AVERAGE GLUCOSE: 194 MG/DL
GLUCOSE QUALITATIVE U: NEGATIVE MG/DL
GLUCOSE SERPL-MCNC: 185 MG/DL
HBA1C MFR BLD HPLC: 8.4 %
HDLC SERPL-MCNC: 43 MG/DL
KETONES URINE: NEGATIVE MG/DL
LDLC SERPL CALC-MCNC: 59 MG/DL
LEUKOCYTE ESTERASE URINE: NEGATIVE
MAGNESIUM SERPL-MCNC: 2.1 MG/DL
MICROALBUMIN 24H UR DL<=1MG/L-MCNC: 78.1 MG/DL
MICROALBUMIN/CREAT 24H UR-RTO: 318 MG/G
MICROSCOPIC-UA: NORMAL
NITRITE URINE: NEGATIVE
NONHDLC SERPL-MCNC: 99 MG/DL
PARATHYROID HORMONE INTACT: 30 PG/ML
PH URINE: 5.5
PHOSPHATE SERPL-MCNC: 3.1 MG/DL
POTASSIUM SERPL-SCNC: 4.5 MMOL/L
PROT SERPL-MCNC: 6.9 G/DL
PROT UR-MCNC: 122 MG/DL
PROTEIN URINE: 100 MG/DL
RED BLOOD CELLS URINE: 2 /HPF
SODIUM SERPL-SCNC: 137 MMOL/L
SPECIFIC GRAVITY URINE: 1.02
TRIGL SERPL-MCNC: 251 MG/DL
URATE SERPL-MCNC: 6.5 MG/DL
UROBILINOGEN URINE: 0.2 MG/DL
WHITE BLOOD CELLS URINE: 0 /HPF

## 2024-07-22 ENCOUNTER — APPOINTMENT (OUTPATIENT)
Dept: UROLOGY | Facility: CLINIC | Age: 69
End: 2024-07-22
Payer: MEDICARE

## 2024-07-22 VITALS
BODY MASS INDEX: 31.83 KG/M2 | HEART RATE: 68 BPM | DIASTOLIC BLOOD PRESSURE: 72 MMHG | WEIGHT: 210 LBS | HEIGHT: 68 IN | RESPIRATION RATE: 16 BRPM | TEMPERATURE: 97.5 F | OXYGEN SATURATION: 96 % | SYSTOLIC BLOOD PRESSURE: 111 MMHG

## 2024-07-22 DIAGNOSIS — N40.1 BENIGN PROSTATIC HYPERPLASIA WITH LOWER URINARY TRACT SYMPMS: ICD-10-CM

## 2024-07-22 DIAGNOSIS — R97.20 ELEVATED PROSTATE, SPECIFIC ANTIGEN [PSA]: ICD-10-CM

## 2024-07-22 DIAGNOSIS — R35.1 BENIGN PROSTATIC HYPERPLASIA WITH LOWER URINARY TRACT SYMPMS: ICD-10-CM

## 2024-07-22 PROCEDURE — G2211 COMPLEX E/M VISIT ADD ON: CPT

## 2024-07-22 PROCEDURE — 99213 OFFICE O/P EST LOW 20 MIN: CPT

## 2024-07-22 NOTE — HISTORY OF PRESENT ILLNESS
[FreeTextEntry1] : He is a 69 year-old man who was seen today for elevated PSA level, enlarged prostate and urinary symptoms.  Nocturia is usually once or twice.  Residual urine volume today was minimal.  PSA level was 5.97 in July 2024.  In 2023, PSA level was 6.1.  He is on tamsulosin once a night.  Previous notes: MRI of the prostate in August 2021 showed 117 g prostate and no MRI suspicious lesions.  He has not had a prior prostate biopsy. He has history of erectile dysfunction but not sexually very active.

## 2024-07-22 NOTE — PHYSICAL EXAM
[Urethral Meatus] : meatus normal [Urinary Bladder Findings] : the bladder was normal on palpation [Scrotum] : the scrotum was normal [Testes Mass (___cm)] : there were no testicular masses [Prostate Tenderness] : the prostate was not tender [No Prostate Nodules] : no prostate nodules [Prostate Enlarged] : was enlarged [FreeTextEntry1] : Genital exam was done previously [General Appearance - Well Developed] : well developed [General Appearance - Well Nourished] : well nourished [Normal Appearance] : normal appearance [Well Groomed] : well groomed [] : no respiratory distress [Exaggerated Use Of Accessory Muscles For Inspiration] : no accessory muscle use [Abdomen Soft] : soft [Abdomen Tenderness] : non-tender

## 2024-07-22 NOTE — ASSESSMENT
[FreeTextEntry1] : He has no new urinary symptoms.  The last 2 PSA levels were around 6.  His MRI was about 3 years ago.  He is concerned about developing prostate cancer because he is not sure if his brother has prostate cancer in the [facility].  Pending insurance approval, MRI of the prostate will be repeated for repeat evaluation since the last MRI was about 3 years ago.  We will discuss the results on the next step on the phone.  He can follow-up in 1 year with repeat PSA level pending results of prostate MRI.  Joseph Butterfield MD, FACS [facility]  of Urology  [address] [place] [address], [place] [address]  Tel: [phone] Fax: [phone]

## 2024-07-22 NOTE — LETTER BODY
[Dear  ___] : Dear  [unfilled], [Consult Letter:] : I had the pleasure of evaluating your patient, [unfilled]. [Consult Closing:] : Thank you very much for allowing me to participate in the care of this patient.  If you have any questions, please do not hesitate to contact me. [FreeTextEntry1] : [facility]\par  Address: [address], \[place]  [place] [address]\par  [phone]

## 2024-08-02 ENCOUNTER — APPOINTMENT (OUTPATIENT)
Dept: MRI IMAGING | Facility: CLINIC | Age: 69
End: 2024-08-02

## 2024-08-02 ENCOUNTER — OUTPATIENT (OUTPATIENT)
Dept: OUTPATIENT SERVICES | Facility: HOSPITAL | Age: 69
LOS: 1 days | End: 2024-08-02
Payer: MEDICARE

## 2024-08-02 ENCOUNTER — RESULT REVIEW (OUTPATIENT)
Age: 69
End: 2024-08-02

## 2024-08-02 DIAGNOSIS — Z98.89 OTHER SPECIFIED POSTPROCEDURAL STATES: Chronic | ICD-10-CM

## 2024-08-02 DIAGNOSIS — R97.20 ELEVATED PROSTATE SPECIFIC ANTIGEN [PSA]: ICD-10-CM

## 2024-08-02 PROCEDURE — 72197 MRI PELVIS W/O & W/DYE: CPT | Mod: 26

## 2024-08-02 PROCEDURE — 76498P: CUSTOM | Mod: 26

## 2024-08-02 PROCEDURE — 76498 UNLISTED MR PROCEDURE: CPT

## 2024-08-02 PROCEDURE — A9585: CPT

## 2024-08-02 PROCEDURE — 72197 MRI PELVIS W/O & W/DYE: CPT

## 2024-08-12 ENCOUNTER — NON-APPOINTMENT (OUTPATIENT)
Age: 69
End: 2024-08-12

## 2024-12-20 ENCOUNTER — APPOINTMENT (OUTPATIENT)
Dept: UROLOGY | Facility: CLINIC | Age: 69
End: 2024-12-20

## 2024-12-23 ENCOUNTER — NON-APPOINTMENT (OUTPATIENT)
Age: 69
End: 2024-12-23

## 2024-12-23 ENCOUNTER — APPOINTMENT (OUTPATIENT)
Dept: NEPHROLOGY | Facility: CLINIC | Age: 69
End: 2024-12-23
Payer: MEDICARE

## 2024-12-23 VITALS
BODY MASS INDEX: 29.1 KG/M2 | HEART RATE: 70 BPM | SYSTOLIC BLOOD PRESSURE: 123 MMHG | TEMPERATURE: 97.2 F | WEIGHT: 192 LBS | HEIGHT: 68 IN | OXYGEN SATURATION: 99 % | DIASTOLIC BLOOD PRESSURE: 74 MMHG

## 2024-12-23 DIAGNOSIS — N18.30 CHRONIC KIDNEY DISEASE, STAGE 3 UNSPECIFIED: ICD-10-CM

## 2024-12-23 DIAGNOSIS — E78.5 HYPERLIPIDEMIA, UNSPECIFIED: ICD-10-CM

## 2024-12-23 DIAGNOSIS — E11.9 TYPE 2 DIABETES MELLITUS W/OUT COMPLICATIONS: ICD-10-CM

## 2024-12-23 DIAGNOSIS — I10 ESSENTIAL (PRIMARY) HYPERTENSION: ICD-10-CM

## 2024-12-23 PROCEDURE — 99215 OFFICE O/P EST HI 40 MIN: CPT

## 2024-12-24 LAB
25(OH)D3 SERPL-MCNC: 44 NG/ML
ALBUMIN SERPL ELPH-MCNC: 3.9 G/DL
ALP BLD-CCNC: 89 U/L
ALT SERPL-CCNC: 23 U/L
ANION GAP SERPL CALC-SCNC: 12 MMOL/L
APPEARANCE: CLEAR
AST SERPL-CCNC: 20 U/L
BACTERIA: NEGATIVE /HPF
BILIRUB SERPL-MCNC: 0.4 MG/DL
BILIRUBIN URINE: NEGATIVE
BLOOD URINE: NEGATIVE
BUN SERPL-MCNC: 32 MG/DL
CALCIUM SERPL-MCNC: 9.6 MG/DL
CALCIUM SERPL-MCNC: 9.6 MG/DL
CAST: 2 /LPF
CHLORIDE SERPL-SCNC: 108 MMOL/L
CHOLEST SERPL-MCNC: 152 MG/DL
CO2 SERPL-SCNC: 24 MMOL/L
COLOR: YELLOW
CREAT SERPL-MCNC: 2.24 MG/DL
CREAT SPEC-SCNC: 205 MG/DL
CREAT SPEC-SCNC: 205 MG/DL
CREAT/PROT UR: 0.2 RATIO
EGFR: 31 ML/MIN/1.73M2
EPITHELIAL CELLS: 0 /HPF
ESTIMATED AVERAGE GLUCOSE: 128 MG/DL
GLUCOSE QUALITATIVE U: NEGATIVE MG/DL
GLUCOSE SERPL-MCNC: 64 MG/DL
HBA1C MFR BLD HPLC: 6.1 %
HCT VFR BLD CALC: 41.1 %
HDLC SERPL-MCNC: 54 MG/DL
HGB BLD-MCNC: 13.7 G/DL
KETONES URINE: NEGATIVE MG/DL
LDLC SERPL CALC-MCNC: 77 MG/DL
LEUKOCYTE ESTERASE URINE: NEGATIVE
MAGNESIUM SERPL-MCNC: 2.4 MG/DL
MCHC RBC-ENTMCNC: 31.5 PG
MCHC RBC-ENTMCNC: 33.3 G/DL
MCV RBC AUTO: 94.5 FL
MICROALBUMIN 24H UR DL<=1MG/L-MCNC: 17.2 MG/DL
MICROALBUMIN/CREAT 24H UR-RTO: 84 MG/G
MICROSCOPIC-UA: NORMAL
NITRITE URINE: NEGATIVE
NONHDLC SERPL-MCNC: 98 MG/DL
PARATHYROID HORMONE INTACT: 47 PG/ML
PH URINE: 5.5
PHOSPHATE SERPL-MCNC: 3.9 MG/DL
PLATELET # BLD AUTO: 196 K/UL
POTASSIUM SERPL-SCNC: 4.7 MMOL/L
PROT SERPL-MCNC: 7.2 G/DL
PROT UR-MCNC: 37 MG/DL
PROTEIN URINE: 30 MG/DL
RBC # BLD: 4.35 M/UL
RBC # FLD: 12.7 %
RED BLOOD CELLS URINE: 2 /HPF
SODIUM SERPL-SCNC: 144 MMOL/L
SPECIFIC GRAVITY URINE: 1.02
TRIGL SERPL-MCNC: 118 MG/DL
URATE SERPL-MCNC: 7.1 MG/DL
UROBILINOGEN URINE: 0.2 MG/DL
WBC # FLD AUTO: 8.34 K/UL
WHITE BLOOD CELLS URINE: 0 /HPF

## 2025-06-09 ENCOUNTER — APPOINTMENT (OUTPATIENT)
Dept: NEPHROLOGY | Facility: CLINIC | Age: 70
End: 2025-06-09
Payer: MEDICARE

## 2025-06-09 VITALS
BODY MASS INDEX: 28.59 KG/M2 | TEMPERATURE: 97.7 F | WEIGHT: 188 LBS | HEART RATE: 70 BPM | OXYGEN SATURATION: 99 % | SYSTOLIC BLOOD PRESSURE: 115 MMHG | DIASTOLIC BLOOD PRESSURE: 74 MMHG

## 2025-06-09 PROBLEM — E11.29 CONTROLLED TYPE 2 DIABETES MELLITUS WITH OTHER DIABETIC KIDNEY COMPLICATION, WITH LONG-TERM CURRENT USE OF INSULIN: Status: ACTIVE | Noted: 2025-06-09

## 2025-06-09 PROCEDURE — 99214 OFFICE O/P EST MOD 30 MIN: CPT

## 2025-06-17 ENCOUNTER — NON-APPOINTMENT (OUTPATIENT)
Age: 70
End: 2025-06-17

## 2025-06-19 ENCOUNTER — NON-APPOINTMENT (OUTPATIENT)
Age: 70
End: 2025-06-19

## 2025-06-19 LAB
25(OH)D3 SERPL-MCNC: 44.7 NG/ML
ALBUMIN SERPL ELPH-MCNC: 4.3 G/DL
ALP BLD-CCNC: 81 U/L
ALT SERPL-CCNC: 18 U/L
ANION GAP SERPL CALC-SCNC: 16 MMOL/L
APPEARANCE: CLEAR
AST SERPL-CCNC: 19 U/L
BACTERIA: NEGATIVE /HPF
BILIRUB SERPL-MCNC: 0.5 MG/DL
BILIRUBIN URINE: NEGATIVE
BLOOD URINE: NEGATIVE
BUN SERPL-MCNC: 25 MG/DL
CALCIUM SERPL-MCNC: 9.4 MG/DL
CALCIUM SERPL-MCNC: 9.4 MG/DL
CAST: 5 /LPF
CHLORIDE SERPL-SCNC: 103 MMOL/L
CHOLEST SERPL-MCNC: 132 MG/DL
CO2 SERPL-SCNC: 19 MMOL/L
COLOR: YELLOW
CREAT SERPL-MCNC: 2.04 MG/DL
CREAT SPEC-SCNC: 291 MG/DL
CREAT SPEC-SCNC: 291 MG/DL
CREAT/PROT UR: 0.1 RATIO
EGFRCR SERPLBLD CKD-EPI 2021: 34 ML/MIN/1.73M2
EPITHELIAL CELLS: 0 /HPF
ESTIMATED AVERAGE GLUCOSE: 134 MG/DL
GLUCOSE QUALITATIVE U: NEGATIVE MG/DL
GLUCOSE SERPL-MCNC: 68 MG/DL
HBA1C MFR BLD HPLC: 6.3 %
HCT VFR BLD CALC: 41.2 %
HDLC SERPL-MCNC: 52 MG/DL
HGB BLD-MCNC: 13.4 G/DL
KETONES URINE: NEGATIVE MG/DL
LDLC SERPL-MCNC: 60 MG/DL
LEUKOCYTE ESTERASE URINE: NEGATIVE
MAGNESIUM SERPL-MCNC: 2.3 MG/DL
MCHC RBC-ENTMCNC: 30.9 PG
MCHC RBC-ENTMCNC: 32.5 G/DL
MCV RBC AUTO: 95.2 FL
MICROALBUMIN 24H UR DL<=1MG/L-MCNC: 15.6 MG/DL
MICROALBUMIN/CREAT 24H UR-RTO: 54 MG/G
MICROSCOPIC-UA: NORMAL
NITRITE URINE: NEGATIVE
NONHDLC SERPL-MCNC: 81 MG/DL
PARATHYROID HORMONE INTACT: 50 PG/ML
PH URINE: 5.5
PHOSPHATE SERPL-MCNC: 3.2 MG/DL
PLATELET # BLD AUTO: 201 K/UL
POTASSIUM SERPL-SCNC: 4.9 MMOL/L
PROT SERPL-MCNC: 7.1 G/DL
PROT UR-MCNC: 36 MG/DL
PROTEIN URINE: 30 MG/DL
RBC # BLD: 4.33 M/UL
RBC # FLD: 12.8 %
RED BLOOD CELLS URINE: 1 /HPF
SODIUM SERPL-SCNC: 138 MMOL/L
SPECIFIC GRAVITY URINE: 1.02
TRIGL SERPL-MCNC: 114 MG/DL
URATE SERPL-MCNC: 7 MG/DL
UROBILINOGEN URINE: 0.2 MG/DL
WBC # FLD AUTO: 7.9 K/UL
WHITE BLOOD CELLS URINE: 0 /HPF

## 2025-08-15 ENCOUNTER — APPOINTMENT (OUTPATIENT)
Dept: UROLOGY | Facility: CLINIC | Age: 70
End: 2025-08-15
Payer: MEDICARE

## 2025-08-15 VITALS
HEIGHT: 68 IN | DIASTOLIC BLOOD PRESSURE: 72 MMHG | BODY MASS INDEX: 28.49 KG/M2 | SYSTOLIC BLOOD PRESSURE: 123 MMHG | WEIGHT: 188 LBS | RESPIRATION RATE: 16 BRPM | HEART RATE: 74 BPM | OXYGEN SATURATION: 94 %

## 2025-08-15 DIAGNOSIS — N40.1 BENIGN PROSTATIC HYPERPLASIA WITH LOWER URINARY TRACT SYMPMS: ICD-10-CM

## 2025-08-15 DIAGNOSIS — R35.1 BENIGN PROSTATIC HYPERPLASIA WITH LOWER URINARY TRACT SYMPMS: ICD-10-CM

## 2025-08-15 DIAGNOSIS — R97.20 ELEVATED PROSTATE, SPECIFIC ANTIGEN [PSA]: ICD-10-CM

## 2025-08-15 PROCEDURE — 99214 OFFICE O/P EST MOD 30 MIN: CPT

## 2025-08-15 PROCEDURE — G2211 COMPLEX E/M VISIT ADD ON: CPT

## 2025-08-18 ENCOUNTER — NON-APPOINTMENT (OUTPATIENT)
Age: 70
End: 2025-08-18

## 2025-08-18 LAB
PSA FREE FLD-MCNC: 32 %
PSA FREE SERPL-MCNC: 2.81 NG/ML
PSA SERPL-MCNC: 8.87 NG/ML

## 2025-09-13 ENCOUNTER — NON-APPOINTMENT (OUTPATIENT)
Age: 70
End: 2025-09-13

## 2025-09-15 ENCOUNTER — APPOINTMENT (OUTPATIENT)
Dept: NEPHROLOGY | Facility: CLINIC | Age: 70
End: 2025-09-15
Payer: MEDICARE

## 2025-09-15 VITALS
OXYGEN SATURATION: 100 % | HEIGHT: 68 IN | TEMPERATURE: 98.1 F | DIASTOLIC BLOOD PRESSURE: 77 MMHG | BODY MASS INDEX: 28.04 KG/M2 | HEART RATE: 74 BPM | SYSTOLIC BLOOD PRESSURE: 125 MMHG | WEIGHT: 185 LBS

## 2025-09-15 DIAGNOSIS — N18.30 CHRONIC KIDNEY DISEASE, STAGE 3 UNSPECIFIED: ICD-10-CM

## 2025-09-15 DIAGNOSIS — E78.5 HYPERLIPIDEMIA, UNSPECIFIED: ICD-10-CM

## 2025-09-15 DIAGNOSIS — E11.29 TYPE 2 DIABETES MELLITUS WITH OTHER DIABETIC KIDNEY COMPLICATION: ICD-10-CM

## 2025-09-15 DIAGNOSIS — Z79.4 TYPE 2 DIABETES MELLITUS WITH OTHER DIABETIC KIDNEY COMPLICATION: ICD-10-CM

## 2025-09-15 DIAGNOSIS — I10 ESSENTIAL (PRIMARY) HYPERTENSION: ICD-10-CM

## 2025-09-15 PROCEDURE — 99214 OFFICE O/P EST MOD 30 MIN: CPT

## 2025-09-17 LAB
25(OH)D3 SERPL-MCNC: 46.7 NG/ML
ALBUMIN SERPL ELPH-MCNC: 4.3 G/DL
ALBUMIN, RANDOM URINE: 11.5 MG/DL
ALP BLD-CCNC: 78 U/L
ALT SERPL-CCNC: 19 U/L
ANION GAP SERPL CALC-SCNC: 15 MMOL/L
APPEARANCE: CLEAR
AST SERPL-CCNC: 19 U/L
BACTERIA: NEGATIVE /HPF
BILIRUB SERPL-MCNC: 0.5 MG/DL
BILIRUBIN URINE: NEGATIVE
BLOOD URINE: NEGATIVE
BUN SERPL-MCNC: 26 MG/DL
CALCIUM SERPL-MCNC: 9.1 MG/DL
CALCIUM SERPL-MCNC: 9.1 MG/DL
CAST: 1 /LPF
CHLORIDE SERPL-SCNC: 104 MMOL/L
CHOLEST SERPL-MCNC: 116 MG/DL
CO2 SERPL-SCNC: 22 MMOL/L
COLOR: YELLOW
CREAT SERPL-MCNC: 2.03 MG/DL
CREAT SPEC-SCNC: 264 MG/DL
CREAT SPEC-SCNC: 264 MG/DL
CREAT/PROT UR: 0.1 RATIO
EGFRCR SERPLBLD CKD-EPI 2021: 34 ML/MIN/1.73M2
EPITHELIAL CELLS: 0 /HPF
ESTIMATED AVERAGE GLUCOSE: 126 MG/DL
GLUCOSE QUALITATIVE U: NEGATIVE MG/DL
GLUCOSE SERPL-MCNC: 69 MG/DL
HBA1C MFR BLD HPLC: 6 %
HCT VFR BLD CALC: 39.9 %
HDLC SERPL-MCNC: 44 MG/DL
HGB BLD-MCNC: 13.2 G/DL
KETONES URINE: NEGATIVE MG/DL
LDLC SERPL-MCNC: 54 MG/DL
LEUKOCYTE ESTERASE URINE: NEGATIVE
MAGNESIUM SERPL-MCNC: 2.3 MG/DL
MCHC RBC-ENTMCNC: 31.6 PG
MCHC RBC-ENTMCNC: 33.1 G/DL
MCV RBC AUTO: 95.5 FL
MICROALBUMIN/CREAT 24H UR-RTO: 44 MG/G
MICROSCOPIC-UA: NORMAL
NITRITE URINE: NEGATIVE
NONHDLC SERPL-MCNC: 71 MG/DL
PARATHYROID HORMONE INTACT: 51 PG/ML
PH URINE: 5.5
PHOSPHATE SERPL-MCNC: 2.9 MG/DL
PLATELET # BLD AUTO: 194 K/UL
POTASSIUM SERPL-SCNC: 4.3 MMOL/L
PROT SERPL-MCNC: 7.2 G/DL
PROT UR-MCNC: 28 MG/DL
PROTEIN URINE: 30 MG/DL
RBC # BLD: 4.18 M/UL
RBC # FLD: 13.2 %
RED BLOOD CELLS URINE: 1 /HPF
SODIUM SERPL-SCNC: 141 MMOL/L
SPECIFIC GRAVITY URINE: 1.02
TRIGL SERPL-MCNC: 90 MG/DL
URATE SERPL-MCNC: 6.7 MG/DL
UROBILINOGEN URINE: 0.2 MG/DL
WBC # FLD AUTO: 7.94 K/UL
WHITE BLOOD CELLS URINE: 0 /HPF